# Patient Record
Sex: FEMALE | Race: WHITE | Employment: OTHER | URBAN - METROPOLITAN AREA
[De-identification: names, ages, dates, MRNs, and addresses within clinical notes are randomized per-mention and may not be internally consistent; named-entity substitution may affect disease eponyms.]

---

## 2017-01-17 DIAGNOSIS — D51.3 OTHER DIETARY VITAMIN B12 DEFICIENCY ANEMIA: Primary | ICD-10-CM

## 2017-01-22 RX ORDER — CYANOCOBALAMIN 1000 UG/ML
INJECTION INTRAMUSCULAR; SUBCUTANEOUS
Qty: 3 ML | Refills: 0 | Status: SHIPPED | OUTPATIENT
Start: 2017-01-22 | End: 2018-04-20

## 2018-04-20 DIAGNOSIS — D51.3 OTHER DIETARY VITAMIN B12 DEFICIENCY ANEMIA: ICD-10-CM

## 2018-04-23 RX ORDER — CYANOCOBALAMIN 1000 UG/ML
INJECTION INTRAMUSCULAR; SUBCUTANEOUS
Qty: 3 ML | Refills: 0 | Status: SHIPPED | OUTPATIENT
Start: 2018-04-23 | End: 2018-04-25

## 2018-04-25 DIAGNOSIS — D51.3 OTHER DIETARY VITAMIN B12 DEFICIENCY ANEMIA: ICD-10-CM

## 2018-04-25 RX ORDER — CYANOCOBALAMIN 1000 UG/ML
1000 INJECTION INTRAMUSCULAR; SUBCUTANEOUS
Qty: 3 ML | Refills: 3 | Status: SHIPPED | OUTPATIENT
Start: 2018-06-25 | End: 2019-05-22

## 2019-05-22 DIAGNOSIS — D51.3 OTHER DIETARY VITAMIN B12 DEFICIENCY ANEMIA: ICD-10-CM

## 2019-05-24 RX ORDER — CYANOCOBALAMIN 1000 UG/ML
1000 INJECTION INTRAMUSCULAR; SUBCUTANEOUS
Qty: 3 ML | Refills: 3 | Status: SHIPPED | OUTPATIENT
Start: 2019-05-24 | End: 2019-11-01

## 2019-05-24 RX ORDER — CYANOCOBALAMIN 1000 UG/ML
INJECTION INTRAMUSCULAR; SUBCUTANEOUS
Qty: 3 ML | Refills: 0 | Status: SHIPPED | OUTPATIENT
Start: 2019-05-24

## 2020-02-06 RX ORDER — TORSEMIDE 20 MG/1
20 TABLET ORAL DAILY
COMMUNITY

## 2020-02-06 RX ORDER — AMIODARONE HYDROCHLORIDE 200 MG/1
200 TABLET ORAL DAILY
COMMUNITY

## 2020-02-06 RX ORDER — RANITIDINE 300 MG/1
300 TABLET ORAL 2 TIMES DAILY
COMMUNITY
End: 2021-09-13

## 2020-02-06 RX ORDER — POTASSIUM CHLORIDE 1500 MG/1
20 TABLET, FILM COATED, EXTENDED RELEASE ORAL AS NEEDED
COMMUNITY

## 2020-02-06 RX ORDER — PANTOPRAZOLE SODIUM 40 MG/1
40 TABLET, DELAYED RELEASE ORAL
COMMUNITY
End: 2020-12-17

## 2020-02-06 RX ORDER — DILTIAZEM HYDROCHLORIDE 120 MG/1
120 TABLET, FILM COATED ORAL 2 TIMES DAILY
Status: ON HOLD | COMMUNITY
End: 2020-02-11

## 2020-02-06 RX ORDER — LEVOTHYROXINE SODIUM 0.12 MG/1
125 TABLET ORAL
COMMUNITY

## 2020-02-08 ENCOUNTER — ANESTHESIA EVENT (OUTPATIENT)
Dept: SURGERY | Facility: HOSPITAL | Age: 80
End: 2020-02-08
Payer: MEDICARE

## 2020-02-11 ENCOUNTER — HOSPITAL ENCOUNTER (OUTPATIENT)
Facility: HOSPITAL | Age: 80
Discharge: HOME | End: 2020-02-11
Attending: OBSTETRICS & GYNECOLOGY | Admitting: OBSTETRICS & GYNECOLOGY
Payer: MEDICARE

## 2020-02-11 ENCOUNTER — ANESTHESIA (OUTPATIENT)
Dept: SURGERY | Facility: HOSPITAL | Age: 80
End: 2020-02-11
Payer: MEDICARE

## 2020-02-11 ENCOUNTER — HOSPITAL ENCOUNTER (OUTPATIENT)
Dept: ULTRASOUND IMAGING | Facility: HOSPITAL | Age: 80
Discharge: HOME | End: 2020-02-11
Attending: OBSTETRICS & GYNECOLOGY
Payer: MEDICARE

## 2020-02-11 VITALS
SYSTOLIC BLOOD PRESSURE: 135 MMHG | DIASTOLIC BLOOD PRESSURE: 59 MMHG | TEMPERATURE: 98 F | RESPIRATION RATE: 18 BRPM | WEIGHT: 192.88 LBS | OXYGEN SATURATION: 95 % | HEART RATE: 55 BPM | BODY MASS INDEX: 32.93 KG/M2 | HEIGHT: 64 IN

## 2020-02-11 DIAGNOSIS — R93.89 THICKENED ENDOMETRIUM: ICD-10-CM

## 2020-02-11 PROCEDURE — 88305 TISSUE EXAM BY PATHOLOGIST: CPT | Performed by: OBSTETRICS & GYNECOLOGY

## 2020-02-11 PROCEDURE — 0UDB7ZX EXTRACTION OF ENDOMETRIUM, VIA NATURAL OR ARTIFICIAL OPENING, DIAGNOSTIC: ICD-10-PCS | Performed by: OBSTETRICS & GYNECOLOGY

## 2020-02-11 RX ORDER — SODIUM CHLORIDE, SODIUM LACTATE, POTASSIUM CHLORIDE, CALCIUM CHLORIDE 600; 310; 30; 20 MG/100ML; MG/100ML; MG/100ML; MG/100ML
INJECTION, SOLUTION INTRAVENOUS CONTINUOUS
Status: DISCONTINUED | OUTPATIENT
Start: 2020-02-11 | End: 2020-02-11

## 2020-02-11 RX ORDER — ERGOCALCIFEROL (VITAMIN D2) 1250 MCG
50000 CAPSULE ORAL WEEKLY
COMMUNITY

## 2020-02-11 RX ORDER — ONDANSETRON 2 MG/ML
4 INJECTION INTRAMUSCULAR; INTRAVENOUS AS NEEDED
Status: DISCONTINUED | OUTPATIENT
Start: 2020-02-11 | End: 2020-02-11

## 2020-02-11 RX ORDER — DILTIAZEM HYDROCHLORIDE 120 MG/1
120 CAPSULE, COATED, EXTENDED RELEASE ORAL DAILY
COMMUNITY

## 2020-02-11 RX ORDER — NALOXONE HYDROCHLORIDE 0.4 MG/ML
80 INJECTION, SOLUTION INTRAMUSCULAR; INTRAVENOUS; SUBCUTANEOUS AS NEEDED
Status: DISCONTINUED | OUTPATIENT
Start: 2020-02-11 | End: 2020-02-11

## 2020-02-11 RX ORDER — ACETAMINOPHEN 500 MG
1000 TABLET ORAL ONCE
Status: DISCONTINUED | OUTPATIENT
Start: 2020-02-11 | End: 2020-02-11 | Stop reason: HOSPADM

## 2020-02-11 RX ORDER — HYDROMORPHONE HYDROCHLORIDE 1 MG/ML
0.4 INJECTION, SOLUTION INTRAMUSCULAR; INTRAVENOUS; SUBCUTANEOUS EVERY 5 MIN PRN
Status: DISCONTINUED | OUTPATIENT
Start: 2020-02-11 | End: 2020-02-11

## 2020-02-11 RX ORDER — ONDANSETRON 2 MG/ML
INJECTION INTRAMUSCULAR; INTRAVENOUS AS NEEDED
Status: DISCONTINUED | OUTPATIENT
Start: 2020-02-11 | End: 2020-02-11 | Stop reason: SURG

## 2020-02-11 RX ORDER — HEPARIN SODIUM 5000 [USP'U]/ML
5000 INJECTION, SOLUTION INTRAVENOUS; SUBCUTANEOUS ONCE
Status: COMPLETED | OUTPATIENT
Start: 2020-02-11 | End: 2020-02-11

## 2020-02-11 RX ORDER — LIDOCAINE HYDROCHLORIDE 10 MG/ML
INJECTION, SOLUTION EPIDURAL; INFILTRATION; INTRACAUDAL; PERINEURAL AS NEEDED
Status: DISCONTINUED | OUTPATIENT
Start: 2020-02-11 | End: 2020-02-11 | Stop reason: SURG

## 2020-02-11 RX ORDER — METOCLOPRAMIDE HYDROCHLORIDE 5 MG/ML
10 INJECTION INTRAMUSCULAR; INTRAVENOUS AS NEEDED
Status: DISCONTINUED | OUTPATIENT
Start: 2020-02-11 | End: 2020-02-11

## 2020-02-11 RX ORDER — ACETAMINOPHEN 500 MG
1000 TABLET ORAL EVERY 6 HOURS PRN
COMMUNITY

## 2020-02-11 RX ORDER — DEXAMETHASONE SODIUM PHOSPHATE 4 MG/ML
VIAL (ML) INJECTION AS NEEDED
Status: DISCONTINUED | OUTPATIENT
Start: 2020-02-11 | End: 2020-02-11 | Stop reason: SURG

## 2020-02-11 RX ORDER — DILTIAZEM HYDROCHLORIDE 120 MG/1
120 CAPSULE, EXTENDED RELEASE ORAL DAILY
Status: ON HOLD | COMMUNITY
End: 2020-02-11

## 2020-02-11 RX ADMIN — SODIUM CHLORIDE, SODIUM LACTATE, POTASSIUM CHLORIDE, CALCIUM CHLORIDE: 600; 310; 30; 20 INJECTION, SOLUTION INTRAVENOUS at 15:09:00

## 2020-02-11 RX ADMIN — DEXAMETHASONE SODIUM PHOSPHATE 4 MG: 4 MG/ML VIAL (ML) INJECTION at 15:26:00

## 2020-02-11 RX ADMIN — ONDANSETRON 4 MG: 2 INJECTION INTRAMUSCULAR; INTRAVENOUS at 15:34:00

## 2020-02-11 RX ADMIN — LIDOCAINE HYDROCHLORIDE 50 MG: 10 INJECTION, SOLUTION EPIDURAL; INFILTRATION; INTRACAUDAL; PERINEURAL at 15:15:00

## 2020-02-11 NOTE — ANESTHESIA PROCEDURE NOTES
Airway  Date/Time: 2/11/2020 3:15 PM  Urgency: elective    Airway not difficult    General Information and Staff    Patient location during procedure: OR  Anesthesiologist: Alma Rosa Morse MD  Performed: anesthesiologist     Indications and Patient Condition

## 2020-02-11 NOTE — H&P
[facility]    PATIENT'S NAME: Michele Jose   ATTENDING PHYSICIAN: Rodri Orosco M.D.    PATIENT ACCOUNT#:   [de-identified]    LOCATION:    MEDICAL RECORD #:   [id number]       YOB: 1940  ADMISSION DATE:       02/11/2020    HISTORY AND

## 2020-02-11 NOTE — PROGRESS NOTES
[facility]  Brief Op Note    Bradford Child Location: OR   [facility] [id number] MRN [id number]   Admission Date 2/11/2020 Operation Date 2/11/2020   Attending Physician Peter Bruce MD Operating Physician Memo Catalan MD     Pre-Operative Diagnosis:

## 2020-02-11 NOTE — DISCHARGE SUMMARY
No complicationsDischarge Summary         Patient ID:  Sherwin Nichols  [id number]  33 year old  6/18/1940    Admission Date: 2/11/2020   Discharge Date:    Discharge Diagnoses: * No post-op diagnosis entered *    Procedures: [unfilled]    Discharged Co

## 2020-02-11 NOTE — ANESTHESIA PREPROCEDURE EVALUATION
PRE-OP EVALUATION    Patient Name: Rhoda Simons    Pre-op Diagnosis: OVARIAN CYST, ENDOMETRIAL THICKENING    Procedure(s):  ULTRASOUND GUIDED DILATION AND CURETTAGE    Surgeon(s) and Role:     Kai Patel MD - Primary    Pre-op vitals reviewed. Smokeless tobacco: Never Used    Alcohol use: No      Drug use: No     Available pre-op labs reviewed.                Airway      Mallampati: II  Mouth opening: >3 FB  TM distance: 4 - 6 cm  Neck ROM: full Cardiovascular    Cardiovascular exam normal.

## 2020-02-11 NOTE — ANESTHESIA POSTPROCEDURE EVALUATION
[facility] Patient Status:  Outpatient in a Bed   Age/Gender 70year old female MRN [id number]   Location [facility] Attending Denny Tee MD   Hosp Day # 0 PCP No primary care provider on file.        Anesthesia Pos

## 2020-02-12 NOTE — OPERATIVE REPORT
[facility]    PATIENT'S NAME: Manuel Moon   ATTENDING PHYSICIAN: Melany Giron M.D. OPERATING PHYSICIAN: Melany Giron M.D.    PATIENT ACCOUNT#:   [de-identified]    LOCATION:  [address] [place] [address] 10  MEDICAL RECORD #:   [id number]

## 2020-02-25 NOTE — PROGRESS NOTES
Discharge Summary         Patient ID:  Silvia Rowley  [id number]  54 year old  6/18/1940    Admission Date: 2/11/2020   Discharge Date: 2/11/2020   Discharge Diagnoses: OVARIAN CYST, ENDOMETRIAL THICKENING    Procedures: [unfilled]    Discharged Condi

## 2020-03-03 DIAGNOSIS — I48.91 ATRIAL FIBRILLATION, UNSPECIFIED TYPE (HCC): Primary | ICD-10-CM

## 2020-03-03 RX ORDER — TORSEMIDE 20 MG/1
20 TABLET ORAL 2 TIMES DAILY
Qty: 60 TABLET | Refills: 0 | Status: SHIPPED | OUTPATIENT
Start: 2020-03-03

## 2020-03-10 NOTE — PROGRESS NOTES
Discharge Summary         Patient ID:  Celso Merchant  [id number]  36 year old  6/18/1940    Admission Date: 2/11/2020   Discharge Date: 2/11/2020   Discharge Diagnoses: OVARIAN CYST, ENDOMETRIAL THICKENING    Procedures: [unfilled]    Discharged Condi

## 2020-04-14 NOTE — PROGRESS NOTES
Discharge Summary         Patient ID:  Bradford Child  [id number]  50 year old  6/18/1940    Admission Date: 2/11/2020   Discharge Date: 2/11/2020   Discharge Diagnoses: OVARIAN CYST, ENDOMETRIAL THICKENING    Procedures: [unfilled]    Discharged Condi

## 2020-06-15 ENCOUNTER — HOSPITAL ENCOUNTER (INPATIENT)
Facility: HOSPITAL | Age: 80
LOS: 2 days | Discharge: HOME | DRG: 389 | End: 2020-06-17
Attending: SURGERY | Admitting: SURGERY
Payer: MEDICARE

## 2020-06-15 ENCOUNTER — APPOINTMENT (OUTPATIENT)
Dept: GENERAL RADIOLOGY | Facility: HOSPITAL | Age: 80
DRG: 389 | End: 2020-06-15
Attending: SURGERY
Payer: MEDICARE

## 2020-06-15 DIAGNOSIS — K56.609 SBO (SMALL BOWEL OBSTRUCTION) (HCC): Primary | ICD-10-CM

## 2020-06-15 DIAGNOSIS — D50.0 IRON DEFICIENCY ANEMIA DUE TO CHRONIC BLOOD LOSS: ICD-10-CM

## 2020-06-15 PROCEDURE — 0D9670Z DRAINAGE OF STOMACH WITH DRAINAGE DEVICE, VIA NATURAL OR ARTIFICIAL OPENING: ICD-10-PCS | Performed by: SURGERY

## 2020-06-15 PROCEDURE — 99223 1ST HOSP IP/OBS HIGH 75: CPT | Performed by: HOSPITALIST

## 2020-06-15 PROCEDURE — 74018 RADEX ABDOMEN 1 VIEW: CPT | Performed by: SURGERY

## 2020-06-15 PROCEDURE — 71045 X-RAY EXAM CHEST 1 VIEW: CPT | Performed by: SURGERY

## 2020-06-15 RX ORDER — NITROGLYCERIN 0.4 MG/1
TABLET SUBLINGUAL
Status: DISPENSED
Start: 2020-06-15 | End: 2020-06-16

## 2020-06-15 RX ORDER — NITROGLYCERIN 0.4 MG/1
TABLET SUBLINGUAL
Status: COMPLETED
Start: 2020-06-15 | End: 2020-06-15

## 2020-06-15 RX ORDER — ONDANSETRON 2 MG/ML
4 INJECTION INTRAMUSCULAR; INTRAVENOUS EVERY 6 HOURS PRN
Status: DISCONTINUED | OUTPATIENT
Start: 2020-06-15 | End: 2020-06-17

## 2020-06-15 RX ORDER — NITROGLYCERIN 0.6 MG/1
0.6 TABLET SUBLINGUAL ONCE
Status: DISCONTINUED | OUTPATIENT
Start: 2020-06-15 | End: 2020-06-17

## 2020-06-15 RX ORDER — ENOXAPARIN SODIUM 100 MG/ML
40 INJECTION SUBCUTANEOUS DAILY
Status: DISCONTINUED | OUTPATIENT
Start: 2020-06-15 | End: 2020-06-17

## 2020-06-15 RX ORDER — METOCLOPRAMIDE HYDROCHLORIDE 5 MG/ML
10 INJECTION INTRAMUSCULAR; INTRAVENOUS EVERY 6 HOURS PRN
Status: DISCONTINUED | OUTPATIENT
Start: 2020-06-15 | End: 2020-06-17

## 2020-06-15 RX ORDER — ONDANSETRON 4 MG/1
4 TABLET, ORALLY DISINTEGRATING ORAL EVERY 6 HOURS PRN
Status: DISCONTINUED | OUTPATIENT
Start: 2020-06-15 | End: 2020-06-17

## 2020-06-15 RX ORDER — SODIUM CHLORIDE, SODIUM LACTATE, POTASSIUM CHLORIDE, CALCIUM CHLORIDE 600; 310; 30; 20 MG/100ML; MG/100ML; MG/100ML; MG/100ML
INJECTION, SOLUTION INTRAVENOUS CONTINUOUS
Status: DISCONTINUED | OUTPATIENT
Start: 2020-06-15 | End: 2020-06-16

## 2020-06-15 RX ORDER — CYANOCOBALAMIN 1000 UG/ML
1000 INJECTION INTRAMUSCULAR; SUBCUTANEOUS
Status: DISCONTINUED | OUTPATIENT
Start: 2020-06-15 | End: 2020-06-16

## 2020-06-15 RX ORDER — ONDANSETRON 2 MG/ML
INJECTION INTRAMUSCULAR; INTRAVENOUS
Status: COMPLETED
Start: 2020-06-15 | End: 2020-06-15

## 2020-06-15 RX ORDER — NITROGLYCERIN 0.4 MG/1
0.4 TABLET SUBLINGUAL EVERY 5 MIN PRN
Status: DISCONTINUED | OUTPATIENT
Start: 2020-06-15 | End: 2020-06-17

## 2020-06-15 RX ORDER — ACETAMINOPHEN 10 MG/ML
1000 INJECTION, SOLUTION INTRAVENOUS EVERY 6 HOURS
Status: DISCONTINUED | OUTPATIENT
Start: 2020-06-15 | End: 2020-06-17

## 2020-06-15 RX ORDER — METOPROLOL TARTRATE 5 MG/5ML
5 INJECTION INTRAVENOUS EVERY 6 HOURS
Status: DISCONTINUED | OUTPATIENT
Start: 2020-06-15 | End: 2020-06-17

## 2020-06-16 ENCOUNTER — APPOINTMENT (OUTPATIENT)
Dept: CT IMAGING | Facility: HOSPITAL | Age: 80
DRG: 389 | End: 2020-06-16
Attending: SURGERY
Payer: MEDICARE

## 2020-06-16 PROCEDURE — 99232 SBSQ HOSP IP/OBS MODERATE 35: CPT | Performed by: HOSPITALIST

## 2020-06-16 PROCEDURE — 99222 1ST HOSP IP/OBS MODERATE 55: CPT | Performed by: INTERNAL MEDICINE

## 2020-06-16 PROCEDURE — 74177 CT ABD & PELVIS W/CONTRAST: CPT | Performed by: SURGERY

## 2020-06-16 RX ORDER — CYANOCOBALAMIN 1000 UG/ML
1000 INJECTION INTRAMUSCULAR; SUBCUTANEOUS
Status: DISCONTINUED | OUTPATIENT
Start: 2020-07-15 | End: 2020-06-17

## 2020-06-16 RX ORDER — DEXTROSE, SODIUM CHLORIDE, AND POTASSIUM CHLORIDE 5; .45; .15 G/100ML; G/100ML; G/100ML
INJECTION INTRAVENOUS CONTINUOUS
Status: DISCONTINUED | OUTPATIENT
Start: 2020-06-16 | End: 2020-06-17

## 2020-06-16 NOTE — CONSULTS
[facility]  Report of Consultation    Chiphon Ramon Patient Status:  Inpatient    1940 MRN [id number]   [facility] 7NE-A Attending Kiera Murray MD   Hosp Day # 1 PCP PHYSICIAN NONSTAFF     Reason for Consultation:    The anayeli reports that she does not drink alcohol or use drugs.     Allergies:  No Known Allergies    Medications:    Current Facility-Administered Medications:   •  dextrose 5 % and 0.45 % NaCl with KCl 20 mEq infusion, , Intravenous, Continuous  •  benzocaine-menth lymphadenopathy. Neck is supple. Respiratory: Clear to auscultation and percussion. No rales. No wheezes. Cardiovascular: Regular rate and rhythm. Gastrointestinal: Soft, non tender with good bowel sounds. Extremities: No edema. No calf tenderness. ileum. On the outside exam of 6/15/2020 there were distended small bowel loops to the level of the mid mesentery slightly to the left of midline the largest loops measured 4.5 cm in greatest dimension.  These are significantly improved on today's exam maxim Please correlate clinically.       Impression and Plan:    Patient Active Problem List:     Shoulder pain     Shoulder fracture, right     Shoulder arthritis     SBO (small bowel obstruction) (HCC)     PAF (paroxysmal atrial fibrillation) ([facility] [address]     CREST (c

## 2020-06-16 NOTE — PROGRESS NOTES
[facility]  Progress Note     Jesus Alberto Jean Patient Status:  Inpatient    1940 MRN [id number]   [facility] 7NE-A Attending Lynn Jones MD   Hosp Day # 1 PCP PHYSICIAN NONSTAFF     Chief Complaint: Medical Management    S: P 1,000 mcg Intramuscular Q30 Days   • pantoprazole (PROTONIX) IV push  40 mg Intravenous Q12H       ASSESSMENT / PLAN:     1. Partial SBO:  NG decompression, IVF's, Bowel rest, eventual SBFT pending hospital course.   2. ARMEN:  Start Venofer, suspect absorpti

## 2020-06-16 NOTE — H&P
[facility]    Patient Name:  Celso Merchant   YOB: 1940   Gender:  Female   Appt Date:  6/15/2020   Provider:  No name on file.    Insurance:  MEDICARE PART A&B     PATIENT PROVIDERS  Referring Provid She was taken to [facility] where work-up included panel of labs which showed anemia at 7.8 but were otherwise largely unremarkable. Notably, her white blood cell count was within normal limits. A CT of the abdomen pelvis was obtained. Alcohol use: No      Drug use: No     Reviewed:  No family history on file. Review of Systems:  Review of Systems   Constitutional: Negative for activity change, appetite change, chills, fatigue, fever and unexpected weight change.    HENT: Negative CLINICAL HISTORY: 70years of age, Female, epigastric pain, bilious vomiting, h/o open momo. COMPARISON: None.     PROCEDURE COMMENTS: CT of the abdomen and pelvis was performed following administration of IV contrast. Oral contrast was administered nova Uterus and ovaries: Slightly globular appearance of the uterus suggesting fibroids or adenomyosis, incompletely characterized on this exam.  No adnexal masses. Vasculature: Atherosclerosis without aneurysm.     Lymph nodes: No abdominal or pelvic lymphad Lower thorax: Visualized lung bases are clear. Heart is normal in size. Liver and biliary tree: No focal liver lesions.   Mild central intrahepatic biliary prominence without dilatation of the common bile duct, likely secondary to postcholecystectomy Abdominal wall: Small bowel containing right direct inguinal hernia. Tiny fat-containing umbilical hernia. Rectus diastases. Musculoskeletal: Degenerative change of the spine. Grade 1 anterolisthesis of L3 with respect to L4.   Transitional lumbosacra Torsemide 20 mg as needed, will hold for now, Bumex 2mg would be the alternative, WILL HOLD FOR NOW  Reportedly, ejection fraction within normal limits.   Start IVF  cc's   Also note history of anemia, receives month B12 shots, remote IV Iron    Elmer

## 2020-06-16 NOTE — H&P
[facility]  History and Physical     Lucero Garcia Patient Status:  Inpatient    1940 MRN [id number]   [facility] 7NE-A Attending Geena Johansen MD   Hosp Day # 0 PCP PHYSICIAN NONSTAFF     Chief Complaint: chest, abd pain facility-administered medications on file prior to encounter. torsemide 20 MG Oral Tab, Take 1 tablet (20 mg total) by mouth 2 (two) times daily. , Disp: 60 tablet, Rfl: 0  acetaminophen 500 MG Oral Tab, Take 1,000 mg by mouth one time. , Disp: , Rfl:   er deformity. Abdomen: Soft, nontender, nondistended. No bowel sounds. No rebound, guarding or organomegaly. Neurologic: No focal neurological deficits. CNII-XII grossly intact. Musculoskeletal: Moves all extremities. Extremities: No cyanosis.  2+ b/l LE NPO  11. HTN  12. HLD    Quality:  · DVT Prophylaxis: lovenox  · CODE status: full  · Pollock: no    Plan of care discussed with pt, rn, dr Vanessa Montesinos, dr Leandro Nowak MD  6/15/2020    ADDENDUM:  Iron deficient. Give IV venofer now.  Will likely need

## 2020-06-16 NOTE — PLAN OF CARE
[facility] Dr Zack Carias at bedside. Aox4. NG tube in place, Rt nare. Placed on LIS. Tan, thick drainage. Episode of emesis, tan, same color as NG output. PRN zofran/reglan with relief. Abdomen soft, rounded. Hypoactive BS. baseline  Description  INTERVENTIONS:  - Continuous cardiac monitoring, monitor vital signs, obtain 12 lead EKG if indicated  - Evaluate effectiveness of antiarrhythmic and heart rate control medications as ordered  - Initiate emergency measures for life t routine/schedule  - Consider collaborating with pharmacy to review patient's medication profile  Outcome: Progressing

## 2020-06-16 NOTE — PHYSICAL THERAPY NOTE
PHYSICAL THERAPY EVALUATION - INPATIENT     Room Number: 6539/7876-Q  Evaluation Date: 6/16/2020  Type of Evaluation: Initial  Physician Order: PT Eval and Treat    Presenting Problem: partial SBO  Reason for Therapy: Mobility Dysfunction and Dischar ASSESSMENT  Upper extremity ROM and strength are within functional limits     Lower extremity ROM is within functional limits     Lower extremity strength is within functional limits     BALANCE  Static Sitting: Fair +  Dynamic Sitting: Fair  Static Standi positioning.,activity. PPE donned for session: gloves, surgical mask.         Exercise/Education Provided:  Bed mobility  Body mechanics  Energy conservation  Functional activity tolerated  Posture  Transfer training    Patient End of Session: Up in chair;N assist device: least restrictive device at assistance level: modified independent     Goal #4 Pt will be independent with HEP.     Goal #5    Goal #6    Goal Comments: Goals established on 6/16/2020

## 2020-06-16 NOTE — PHYSICAL THERAPY NOTE
PT consult received per ADL Mobility Score. RN requesting to hold, as pt just returned to bed to rest. Will continue to follow.

## 2020-06-16 NOTE — PLAN OF CARE
Assumed care of patient at 0700  A/Ox4, RA but desats when sleeping.  NSR/SB on tele  NGT removed by MD  Diet updated to full liquids  PT rec HHPT  Cont IV fluids  Hem on consult  Patient has chronic iron deficiency  CT enterography done, Hem will review wi

## 2020-06-16 NOTE — PROGRESS NOTES
Surgical Oncology Inpatient Progress Note    Subjective:  No acute events overnight. Patient stable and afebrile. NG output cleared overnight, around 200 cc in the last shift. No longer bilious. Patient feels much better this morning.     Objective:  Te

## 2020-06-16 NOTE — CONSULTS
[facility] Patient Status:  Inpatient    1940 MRN [id number]   [facility] 7NE-A Attending Belkis Dunlap MD   Hosp Day # 0 PCP PHYSICIAN NONSTAFF     Chief Complaint: chest, abd pain    History of medications on file prior to encounter. torsemide 20 MG Oral Tab, Take 1 tablet (20 mg total) by mouth 2 (two) times daily. , Disp: 60 tablet, Rfl: 0  acetaminophen 500 MG Oral Tab, Take 1,000 mg by mouth one time. , Disp: , Rfl:   ergocalciferol 1.25 MG ( nondistended. No bowel sounds. No rebound, guarding or organomegaly. Neurologic: No focal neurological deficits. CNII-XII grossly intact. Musculoskeletal: Moves all extremities. Extremities: No cyanosis.  2+ b/l LE edema  Integument: No rashes or lesion Prophylaxis: lovenox  · CODE status: full  · Pollock: no    Plan of care discussed with pt, rn, dr Mela Vargas, dr Ada Olivo MD  6/15/2020    ADDENDUM:  Iron deficient. Give IV venofer now.  Will likely need multiple doses    Mari Smith MD

## 2020-06-17 ENCOUNTER — APPOINTMENT (OUTPATIENT)
Dept: CT IMAGING | Facility: HOSPITAL | Age: 80
DRG: 389 | End: 2020-06-17
Attending: PHYSICIAN ASSISTANT
Payer: MEDICARE

## 2020-06-17 VITALS
RESPIRATION RATE: 18 BRPM | SYSTOLIC BLOOD PRESSURE: 117 MMHG | DIASTOLIC BLOOD PRESSURE: 50 MMHG | BODY MASS INDEX: 33 KG/M2 | TEMPERATURE: 98 F | OXYGEN SATURATION: 97 % | HEART RATE: 58 BPM | WEIGHT: 190.13 LBS

## 2020-06-17 DIAGNOSIS — R07.89 OTHER CHEST PAIN: ICD-10-CM

## 2020-06-17 DIAGNOSIS — D50.0 IRON DEFICIENCY ANEMIA DUE TO CHRONIC BLOOD LOSS: Primary | ICD-10-CM

## 2020-06-17 PROCEDURE — 99232 SBSQ HOSP IP/OBS MODERATE 35: CPT | Performed by: HOSPITALIST

## 2020-06-17 PROCEDURE — 71260 CT THORAX DX C+: CPT | Performed by: PHYSICIAN ASSISTANT

## 2020-06-17 RX ORDER — LEVOTHYROXINE SODIUM 0.12 MG/1
125 TABLET ORAL
Status: DISCONTINUED | OUTPATIENT
Start: 2020-06-18 | End: 2020-06-17

## 2020-06-17 NOTE — PROGRESS NOTES
[facility]  Progress Note     Nona Smith Patient Status:  Inpatient    1940 MRN [id number]   [facility] 7NE-A Attending Pawan Pike MD   Hosp Day # 2 PCP PHYSICIAN NONSTAFF     Chief Complaint: Medical Management    S: P Subcutaneous Daily   • amiodarone (CORDARONE) IV bolus  100 mg Intravenous Q48H   • metoprolol Tartrate  5 mg Intravenous Q6H   • pantoprazole (PROTONIX) IV push  40 mg Intravenous Q12H       ASSESSMENT / PLAN:     1. Partial SBO  1. Resolving  2.  NGT out

## 2020-06-17 NOTE — PLAN OF CARE
NURSING DISCHARGE NOTE    Discharged Home via Wheelchair. Accompanied by Family member  Belongings Taken by patient/family.     Received pt a/ox4, RA, SB on tele at 0700  CT chest completed  Tolerating low fiber diet  BLE +2 edema  Pt educated on disch oxygenation  Description  INTERVENTIONS:  - Assess for changes in respiratory status  - Assess for changes in mentation and behavior  - Position to facilitate oxygenation and minimize respiratory effort  - Oxygen supplementation based on oxygen saturation Progressing

## 2020-06-17 NOTE — PROGRESS NOTES
Surgical Oncology Inpatient Progress Note    Subjective:  No acute events overnight. Patient stable and afebrile. NG removed yesterday. Tolerating full liquids. Feels well. Pain resolved.      Objective:  Temp:  [97.8 °F (36.6 °C)-98.7 °F (37.1 °C)] 98.7

## 2020-06-17 NOTE — PLAN OF CARE
Assumed care at [address]. AOx4. Denies any abdominal pain or discomfort. Desat to 85% while sleeping. 2L O2 applied at night. Sinus tomás on tele monitor and . IV Lopressor on hold. 2+ edema to Bilateral lower extremities.   Continue on IV fluid and Assess and instruct to report SOB or any respiratory difficulty  - Respiratory Therapy support as indicated  - Manage/alleviate anxiety  - Monitor for signs/symptoms of CO2 retention  Outcome: Progressing     Problem: GASTROINTESTINAL - ADULT  Goal: [employer]

## 2020-06-17 NOTE — CM/SW NOTE
Pt admitted for SBO. Pt lives with her  in [facility]. PT is recommending HHPT. Family declined [facility] at this time. / to remain available for support and/or discharge planning.

## 2020-07-29 ENCOUNTER — ANESTHESIA (OUTPATIENT)
Dept: ENDOSCOPY | Facility: HOSPITAL | Age: 80
End: 2020-07-29
Payer: MEDICARE

## 2020-07-29 ENCOUNTER — HOSPITAL ENCOUNTER (OUTPATIENT)
Facility: HOSPITAL | Age: 80
Setting detail: HOSPITAL OUTPATIENT SURGERY
Discharge: HOME | End: 2020-07-29
Attending: INTERNAL MEDICINE | Admitting: INTERNAL MEDICINE
Payer: MEDICARE

## 2020-07-29 ENCOUNTER — ANESTHESIA EVENT (OUTPATIENT)
Dept: ENDOSCOPY | Facility: HOSPITAL | Age: 80
End: 2020-07-29
Payer: MEDICARE

## 2020-07-29 VITALS
TEMPERATURE: 98 F | DIASTOLIC BLOOD PRESSURE: 73 MMHG | BODY MASS INDEX: 30.73 KG/M2 | HEART RATE: 57 BPM | SYSTOLIC BLOOD PRESSURE: 143 MMHG | HEIGHT: 64 IN | OXYGEN SATURATION: 92 % | RESPIRATION RATE: 18 BRPM | WEIGHT: 180 LBS

## 2020-07-29 DIAGNOSIS — R10.9 ABDOMINAL PAIN, UNSPECIFIED ABDOMINAL LOCATION: ICD-10-CM

## 2020-07-29 DIAGNOSIS — Z12.11 COLON CANCER SCREENING: ICD-10-CM

## 2020-07-29 DIAGNOSIS — Z87.19 HISTORY OF ESOPHAGITIS: ICD-10-CM

## 2020-07-29 DIAGNOSIS — R93.3 ABNORMAL CT SCAN, COLON: ICD-10-CM

## 2020-07-29 PROCEDURE — 85025 COMPLETE CBC W/AUTO DIFF WBC: CPT | Performed by: INTERNAL MEDICINE

## 2020-07-29 PROCEDURE — 0DB98ZX EXCISION OF DUODENUM, VIA NATURAL OR ARTIFICIAL OPENING ENDOSCOPIC, DIAGNOSTIC: ICD-10-PCS | Performed by: INTERNAL MEDICINE

## 2020-07-29 PROCEDURE — 80053 COMPREHEN METABOLIC PANEL: CPT | Performed by: INTERNAL MEDICINE

## 2020-07-29 PROCEDURE — 88305 TISSUE EXAM BY PATHOLOGIST: CPT | Performed by: INTERNAL MEDICINE

## 2020-07-29 PROCEDURE — 0DB68ZX EXCISION OF STOMACH, VIA NATURAL OR ARTIFICIAL OPENING ENDOSCOPIC, DIAGNOSTIC: ICD-10-PCS | Performed by: INTERNAL MEDICINE

## 2020-07-29 PROCEDURE — 88312 SPECIAL STAINS GROUP 1: CPT | Performed by: INTERNAL MEDICINE

## 2020-07-29 PROCEDURE — 84238 ASSAY NONENDOCRINE RECEPTOR: CPT | Performed by: INTERNAL MEDICINE

## 2020-07-29 PROCEDURE — 83540 ASSAY OF IRON: CPT | Performed by: INTERNAL MEDICINE

## 2020-07-29 PROCEDURE — 0DJD8ZZ INSPECTION OF LOWER INTESTINAL TRACT, VIA NATURAL OR ARTIFICIAL OPENING ENDOSCOPIC: ICD-10-PCS | Performed by: INTERNAL MEDICINE

## 2020-07-29 PROCEDURE — 0DB58ZX EXCISION OF ESOPHAGUS, VIA NATURAL OR ARTIFICIAL OPENING ENDOSCOPIC, DIAGNOSTIC: ICD-10-PCS | Performed by: INTERNAL MEDICINE

## 2020-07-29 PROCEDURE — 84466 ASSAY OF TRANSFERRIN: CPT | Performed by: INTERNAL MEDICINE

## 2020-07-29 PROCEDURE — 82728 ASSAY OF FERRITIN: CPT | Performed by: INTERNAL MEDICINE

## 2020-07-29 PROCEDURE — 82607 VITAMIN B-12: CPT | Performed by: INTERNAL MEDICINE

## 2020-07-29 PROCEDURE — 84443 ASSAY THYROID STIM HORMONE: CPT | Performed by: INTERNAL MEDICINE

## 2020-07-29 RX ORDER — SODIUM CHLORIDE, SODIUM LACTATE, POTASSIUM CHLORIDE, CALCIUM CHLORIDE 600; 310; 30; 20 MG/100ML; MG/100ML; MG/100ML; MG/100ML
INJECTION, SOLUTION INTRAVENOUS CONTINUOUS
Status: DISCONTINUED | OUTPATIENT
Start: 2020-07-29 | End: 2020-07-29

## 2020-07-29 RX ORDER — LIDOCAINE HYDROCHLORIDE 10 MG/ML
INJECTION, SOLUTION EPIDURAL; INFILTRATION; INTRACAUDAL; PERINEURAL AS NEEDED
Status: DISCONTINUED | OUTPATIENT
Start: 2020-07-29 | End: 2020-07-29 | Stop reason: SURG

## 2020-07-29 RX ADMIN — SODIUM CHLORIDE, SODIUM LACTATE, POTASSIUM CHLORIDE, CALCIUM CHLORIDE: 600; 310; 30; 20 INJECTION, SOLUTION INTRAVENOUS at 13:25:00

## 2020-07-29 RX ADMIN — LIDOCAINE HYDROCHLORIDE 25 MG: 10 INJECTION, SOLUTION EPIDURAL; INFILTRATION; INTRACAUDAL; PERINEURAL at 12:35:00

## 2020-07-29 RX ADMIN — SODIUM CHLORIDE, SODIUM LACTATE, POTASSIUM CHLORIDE, CALCIUM CHLORIDE: 600; 310; 30; 20 INJECTION, SOLUTION INTRAVENOUS at 12:32:00

## 2020-07-29 NOTE — ANESTHESIA POSTPROCEDURE EVALUATION
Patient: Rosa Valdez    Procedure Summary     Date:  07/29/20 Room / Location:  [facility] ENDOSCOPY 01 / [facility] ENDOSCOPY    Anesthesia Start:  0631 Anesthesia Stop:  3915    Procedures:       ESOPHAGOGASTRODUODENOSCOPY (EGD) (N/A )      COLONOSCOPY (N/A ) Kasia Canales

## 2020-07-29 NOTE — ANESTHESIA PREPROCEDURE EVALUATION
Anesthesia PreOp Note    HPI:     Ivy Stewart is a 68year old female who presents for preoperative consultation requested by: Randy Botello MD    Date of Surgery: 7/29/2020    Procedure(s):  ESOPHAGOGASTRODUODENOSCOPY (EGD)  COLONOSCOPY  Indication: dilTIAZem HCl ER Coated Beads 120 MG Oral Capsule SR 24 Hr, Take 120 mg by mouth 2 (two) times daily. , Disp: , Rfl: , 7/29/2020 at 1000  Levothyroxine Sodium 125 MCG Oral Tab, Take 125 mcg by mouth before breakfast., Disp: , Rfl: , 7/29/2020 at 0600  [place] Smoking status: Never Smoker      Smokeless tobacco: Never Used    Substance and Sexual Activity      Alcohol use: No      Drug use: No      Sexual activity: Not on file    Lifestyle      Physical activity:        Days per week: Not on file        Angélica Anesthesia Evaluation     Patient summary reviewed    No history of anesthetic complications   Airway   Mallampati: II  TM distance: >3 FB  Neck ROM: full  Dental          Pulmonary - negative ROS    breath sounds clear to auscultation  Cardiovascular   E

## 2020-07-29 NOTE — H&P
[facility] Department of  Gastroenterology  Update Health History :       Loyd Dumont  female   Osiel MD Bushra     [id number]  6/18/1940 Primary Care Physician  PHYSICIAN NONSTAFF        HPI :  Iron def anemia  GERD  Dysphagia     Patient H 20 mg by mouth daily. , Disp: , Rfl:   apixaban 5 MG Oral Tab, Take 5 mg by mouth 2 (two) times daily. , Disp: , Rfl:   raNITIdine HCl 300 MG Oral Tab, Take 300 mg by mouth 2 (two) times daily.   , Disp: , Rfl:   CYANOCOBALAMIN 1000 MCG/ML Injection Solution,

## 2020-07-29 NOTE — OPERATIVE REPORT
PATIENT NAME: Yvonne Alonso  MRN: [id number]  DATE OF OPERATION: 7/29/2020  PREOPERATIVE DIAGNOSIS:   1. Iron deficiency anemia  2. Dysphagia  3. GERD  POSTOPERATIVE DIAGNOSES:  1. Sliding hiatal hernia, Hill grade 2 approximately 2 cm in length.   2. Signif hernia and vertical length. 2. Normal stomach throughout with no evidence of ulcers, masses or other abnormalities. Retroflexion revealed a normal cardia and fundus. The antrum was normal and the pylorus was patent.   No obvious atrophy noted over the radha supplement. 4. Continue to monitor hemoglobin.     Rosalba Valerio MD  [facility] Gastroenterology

## 2020-07-30 NOTE — PROGRESS NOTES
[phone] (home)   Patient contacted and results and recommendations discussed. All questions answered.

## 2020-07-30 NOTE — PROGRESS NOTES
7/30/2020  [address] [place] [id number]    Dear Micah Gill,       Here are the biopsy/pathology findings from your recent EGD (Upper  Endoscopy):  1. Small bowel (duodenum) biopsies are unremarkable.  No changes as seen with linda

## 2020-08-07 ENCOUNTER — OFFICE VISIT (OUTPATIENT)
Dept: HEMATOLOGY/ONCOLOGY | Facility: HOSPITAL | Age: 80
End: 2020-08-07
Attending: SURGERY
Payer: MEDICARE

## 2020-08-07 VITALS
SYSTOLIC BLOOD PRESSURE: 124 MMHG | RESPIRATION RATE: 18 BRPM | BODY MASS INDEX: 31 KG/M2 | TEMPERATURE: 98 F | OXYGEN SATURATION: 95 % | WEIGHT: 179.19 LBS | DIASTOLIC BLOOD PRESSURE: 65 MMHG | HEART RATE: 56 BPM

## 2020-08-07 DIAGNOSIS — D50.0 IRON DEFICIENCY ANEMIA DUE TO CHRONIC BLOOD LOSS: Primary | ICD-10-CM

## 2020-08-07 PROCEDURE — 96365 THER/PROPH/DIAG IV INF INIT: CPT

## 2020-08-07 PROCEDURE — 96376 TX/PRO/DX INJ SAME DRUG ADON: CPT

## 2020-08-07 NOTE — PROGRESS NOTES
Education Record    Learner:  Patient    Disease / Diagnosis: ARMEN - IV Infed infusion    Barriers / Limitations:  None   Comments:    Method:  Brief focused and Reinforcement   Comments:    General Topics:  Plan of care reviewed   Comments:    Outcome:   Sh

## 2020-08-10 DIAGNOSIS — D50.0 IRON DEFICIENCY ANEMIA DUE TO CHRONIC BLOOD LOSS: Primary | ICD-10-CM

## 2021-01-28 DIAGNOSIS — D51.3 OTHER DIETARY VITAMIN B12 DEFICIENCY ANEMIA: ICD-10-CM

## 2021-01-28 RX ORDER — CYANOCOBALAMIN 1000 UG/ML
1000 INJECTION INTRAMUSCULAR; SUBCUTANEOUS
Qty: 3 ML | Refills: 3 | Status: SHIPPED | OUTPATIENT
Start: 2021-01-28 | End: 2021-12-25

## 2021-03-12 DIAGNOSIS — Z23 NEED FOR VACCINATION: ICD-10-CM

## 2021-09-10 ENCOUNTER — HOSPITAL ENCOUNTER (INPATIENT)
Facility: HOSPITAL | Age: 81
LOS: 1 days | Discharge: HOME | DRG: 390 | End: 2021-09-11
Attending: SURGERY | Admitting: SURGERY
Payer: MEDICARE

## 2021-09-10 DIAGNOSIS — E03.9 HYPOTHYROIDISM, UNSPECIFIED TYPE: ICD-10-CM

## 2021-09-10 DIAGNOSIS — I48.0 PAF (PAROXYSMAL ATRIAL FIBRILLATION) (HCC): ICD-10-CM

## 2021-09-10 DIAGNOSIS — K56.609 SBO (SMALL BOWEL OBSTRUCTION) (HCC): Primary | ICD-10-CM

## 2021-09-10 PROCEDURE — 85025 COMPLETE CBC W/AUTO DIFF WBC: CPT | Performed by: SURGERY

## 2021-09-10 PROCEDURE — 80053 COMPREHEN METABOLIC PANEL: CPT | Performed by: SURGERY

## 2021-09-10 PROCEDURE — 84100 ASSAY OF PHOSPHORUS: CPT | Performed by: SURGERY

## 2021-09-10 PROCEDURE — 83735 ASSAY OF MAGNESIUM: CPT | Performed by: SURGERY

## 2021-09-10 RX ORDER — AMIODARONE HYDROCHLORIDE 200 MG/1
100 TABLET ORAL DAILY
Status: DISCONTINUED | OUTPATIENT
Start: 2021-09-11 | End: 2021-09-12

## 2021-09-10 RX ORDER — SODIUM CHLORIDE, SODIUM LACTATE, POTASSIUM CHLORIDE, CALCIUM CHLORIDE 600; 310; 30; 20 MG/100ML; MG/100ML; MG/100ML; MG/100ML
INJECTION, SOLUTION INTRAVENOUS CONTINUOUS
Status: DISCONTINUED | OUTPATIENT
Start: 2021-09-10 | End: 2021-09-12

## 2021-09-10 RX ORDER — ENOXAPARIN SODIUM 100 MG/ML
40 INJECTION SUBCUTANEOUS DAILY
Status: DISCONTINUED | OUTPATIENT
Start: 2021-09-11 | End: 2021-09-11

## 2021-09-10 RX ORDER — ONDANSETRON 2 MG/ML
4 INJECTION INTRAMUSCULAR; INTRAVENOUS EVERY 6 HOURS PRN
Status: DISCONTINUED | OUTPATIENT
Start: 2021-09-10 | End: 2021-09-12

## 2021-09-10 RX ORDER — DILTIAZEM HYDROCHLORIDE 120 MG/1
120 CAPSULE, EXTENDED RELEASE ORAL DAILY
Status: DISCONTINUED | OUTPATIENT
Start: 2021-09-10 | End: 2021-09-12

## 2021-09-10 RX ORDER — METOCLOPRAMIDE HYDROCHLORIDE 5 MG/ML
10 INJECTION INTRAMUSCULAR; INTRAVENOUS EVERY 8 HOURS PRN
Status: DISCONTINUED | OUTPATIENT
Start: 2021-09-10 | End: 2021-09-12

## 2021-09-10 RX ORDER — DILTIAZEM HYDROCHLORIDE 120 MG/1
120 CAPSULE, EXTENDED RELEASE ORAL 2 TIMES DAILY
Status: DISCONTINUED | OUTPATIENT
Start: 2021-09-10 | End: 2021-09-10

## 2021-09-10 RX ORDER — ACETAMINOPHEN 10 MG/ML
1000 INJECTION, SOLUTION INTRAVENOUS EVERY 6 HOURS
Status: DISCONTINUED | OUTPATIENT
Start: 2021-09-10 | End: 2021-09-12

## 2021-09-11 ENCOUNTER — APPOINTMENT (OUTPATIENT)
Dept: CT IMAGING | Facility: HOSPITAL | Age: 81
DRG: 390 | End: 2021-09-11
Attending: SURGERY
Payer: MEDICARE

## 2021-09-11 ENCOUNTER — APPOINTMENT (OUTPATIENT)
Dept: MRI IMAGING | Facility: HOSPITAL | Age: 81
DRG: 390 | End: 2021-09-11
Attending: SURGERY
Payer: MEDICARE

## 2021-09-11 VITALS
HEART RATE: 48 BPM | HEIGHT: 65 IN | BODY MASS INDEX: 27.96 KG/M2 | OXYGEN SATURATION: 94 % | WEIGHT: 167.81 LBS | SYSTOLIC BLOOD PRESSURE: 126 MMHG | TEMPERATURE: 98 F | RESPIRATION RATE: 18 BRPM | DIASTOLIC BLOOD PRESSURE: 60 MMHG

## 2021-09-11 LAB
ALBUMIN SERPL-MCNC: 3.5 G/DL (ref 3.4–5)
ALBUMIN/GLOB SERPL: 1 {RATIO} (ref 1–2)
ALP LIVER SERPL-CCNC: 71 U/L
ALT SERPL-CCNC: 14 U/L
ANION GAP SERPL CALC-SCNC: 3 MMOL/L (ref 0–18)
AST SERPL-CCNC: 5 U/L (ref 15–37)
BASOPHILS # BLD AUTO: 0.02 X10(3) UL (ref 0–0.2)
BASOPHILS NFR BLD AUTO: 0.4 %
BILIRUB SERPL-MCNC: 0.4 MG/DL (ref 0.1–2)
BUN BLD-MCNC: 19 MG/DL (ref 7–18)
BUN/CREAT SERPL: 20 (ref 10–20)
CALCIUM BLD-MCNC: 8.9 MG/DL (ref 8.5–10.1)
CHLORIDE SERPL-SCNC: 103 MMOL/L (ref 98–112)
CO2 SERPL-SCNC: 33 MMOL/L (ref 21–32)
CREAT BLD-MCNC: 0.95 MG/DL
DEPRECATED RDW RBC AUTO: 47 FL (ref 35.1–46.3)
EOSINOPHIL # BLD AUTO: 0.02 X10(3) UL (ref 0–0.7)
EOSINOPHIL NFR BLD AUTO: 0.4 %
ERYTHROCYTE [DISTWIDTH] IN BLOOD BY AUTOMATED COUNT: 13.7 % (ref 11–15)
GLOBULIN PLAS-MCNC: 3.4 G/DL (ref 2.8–4.4)
GLUCOSE BLD-MCNC: 101 MG/DL (ref 70–99)
HCT VFR BLD AUTO: 31.4 %
HGB BLD-MCNC: 10 G/DL
IMM GRANULOCYTES # BLD AUTO: 0.01 X10(3) UL (ref 0–1)
IMM GRANULOCYTES NFR BLD: 0.2 %
LYMPHOCYTES # BLD AUTO: 0.65 X10(3) UL (ref 1–4)
LYMPHOCYTES NFR BLD AUTO: 12.5 %
M PROTEIN MFR SERPL ELPH: 6.9 G/DL (ref 6.4–8.2)
MAGNESIUM SERPL-MCNC: 2.1 MG/DL (ref 1.6–2.6)
MCH RBC QN AUTO: 29.7 PG (ref 26–34)
MCHC RBC AUTO-ENTMCNC: 31.8 G/DL (ref 31–37)
MCV RBC AUTO: 93.2 FL
MONOCYTES # BLD AUTO: 0.3 X10(3) UL (ref 0.1–1)
MONOCYTES NFR BLD AUTO: 5.8 %
NEUTROPHILS # BLD AUTO: 4.19 X10 (3) UL (ref 1.5–7.7)
NEUTROPHILS # BLD AUTO: 4.19 X10(3) UL (ref 1.5–7.7)
NEUTROPHILS NFR BLD AUTO: 80.7 %
OSMOLALITY SERPL CALC.SUM OF ELEC: 290 MOSM/KG (ref 275–295)
PHOSPHATE SERPL-MCNC: 3.8 MG/DL (ref 2.5–4.9)
PLATELET # BLD AUTO: 161 10(3)UL (ref 150–450)
POTASSIUM SERPL-SCNC: 4.7 MMOL/L (ref 3.5–5.1)
RBC # BLD AUTO: 3.37 X10(6)UL
SARS-COV-2 RNA RESP QL NAA+PROBE: NOT DETECTED
SODIUM SERPL-SCNC: 139 MMOL/L (ref 136–145)
WBC # BLD AUTO: 5.2 X10(3) UL (ref 4–11)

## 2021-09-11 PROCEDURE — 74177 CT ABD & PELVIS W/CONTRAST: CPT | Performed by: SURGERY

## 2021-09-11 PROCEDURE — 71260 CT THORAX DX C+: CPT | Performed by: SURGERY

## 2021-09-11 PROCEDURE — 72148 MRI LUMBAR SPINE W/O DYE: CPT | Performed by: SURGERY

## 2021-09-11 PROCEDURE — C9113 INJ PANTOPRAZOLE SODIUM, VIA: HCPCS | Performed by: SURGERY

## 2021-09-11 RX ORDER — LORAZEPAM 2 MG/ML
1 INJECTION INTRAMUSCULAR ONCE
Status: COMPLETED | OUTPATIENT
Start: 2021-09-11 | End: 2021-09-11

## 2021-09-11 RX ORDER — ENOXAPARIN SODIUM 100 MG/ML
40 INJECTION SUBCUTANEOUS DAILY
Status: DISCONTINUED | OUTPATIENT
Start: 2021-09-12 | End: 2021-09-12

## 2021-09-11 RX ORDER — ENOXAPARIN SODIUM 100 MG/ML
1 INJECTION SUBCUTANEOUS ONCE
Status: COMPLETED | OUTPATIENT
Start: 2021-09-11 | End: 2021-09-11

## 2021-09-11 NOTE — PROGRESS NOTES
Surgical Oncology Inpatient Progress Note    Subjective:  No acute events overnight. No nausea, persistent right upper quadrant discomfort.     Objective:  Temp:  [97.8 °F (36.6 °C)-98.1 °F (36.7 °C)] 97.8 °F (36.6 °C)  Pulse:  [51-63] 51  Resp:  [16-18] 1 Surgical Oncology  [facility]  Pager [phone]  [email or web address]

## 2021-09-11 NOTE — H&P (VIEW-ONLY)
[facility]    Patient Name:  Shoaib Max   YOB: 1940   Gender:  Female   Appt Date:  9/10/2021   Provider:  No name on file.    Insurance:  MEDICARE PART A&B     PATIENT PROVIDERS  Referring Provid disc disease with grade 1 anterolisthesis of L4-L5, L3-L4. She denies any fevers or chills, diarrhea. Of note, over the past several months the patient has lost around 20 pounds.   Surgical history is significant for an open cholecystectomy [remote] and v for chest pain and leg swelling. Gastrointestinal: Positive for abdominal distention, abdominal pain, nausea and vomiting. Endocrine: Negative for polydipsia and polyuria. Genitourinary: Negative for difficulty urinating and dysuria.    Tylor Pro apixaban  3. Bilateral lower extremity numbness and L3-L5 anterolisthesis  We will plan on obtaining MRI lumbar spine for additional work-up  4.   We will consult with hospitalist service in the morning    Brown Melgar MD  [facility]

## 2021-09-11 NOTE — PLAN OF CARE
Direct admit with nausea/vomiting x1 day. Patient is A&O x4. Scheduled IV tylenol for pain relief. Denied nausea overnight; no episodes of vomiting. IV fluids initiated and infusing through PIV placed prior to admission. Ambulating SBA and voiding freely. pain and request assistance  - Assess pain using appropriate pain scale  - Administer analgesics based on type and severity of pain and evaluate response  - Implement non-pharmacological measures as appropriate and evaluate response  - Consider cultural an Instruct patient on fluid and nutrition restrictions as appropriate  Outcome: Progressing     Problem: RISK FOR INFECTION - ADULT  Goal: Absence of fever/infection during anticipated neutropenic period  Description: INTERVENTIONS  - Monitor WBC  - Administ

## 2021-09-11 NOTE — H&P
[facility]    Patient Name:  Judit Alfaro   YOB: 1940   Gender:  Female   Appt Date:  9/10/2021   Provider:  No name on file.    Insurance:  MEDICARE PART A&B     PATIENT PROVIDERS  Referring Provid disc disease with grade 1 anterolisthesis of L4-L5, L3-L4. She denies any fevers or chills, diarrhea. Of note, over the past several months the patient has lost around 20 pounds.   Surgical history is significant for an open cholecystectomy [remote] and v for chest pain and leg swelling. Gastrointestinal: Positive for abdominal distention, abdominal pain, nausea and vomiting. Endocrine: Negative for polydipsia and polyuria. Genitourinary: Negative for difficulty urinating and dysuria.    Janel Heart apixaban  3. Bilateral lower extremity numbness and L3-L5 anterolisthesis  We will plan on obtaining MRI lumbar spine for additional work-up  4.   We will consult with hospitalist service in the morning    Pastora Castaneda MD  [facility]

## 2021-09-11 NOTE — PLAN OF CARE
Pt is alert/oriented. Drowsy at times throughout shift, easily arousable. Vitals stable, HR running low. MD aware. Tolerating diet, advanced to low fiber/soft. No complaints of pain, nausea, or vomiting. Ambulating well with standby assistance.  IVF infusin severity of pain and evaluate response  - Implement non-pharmacological measures as appropriate and evaluate response  - Consider cultural and social influences on pain and pain management  - Manage/alleviate anxiety  - Utilize distraction and/or relaxatio bowel function  - Maintain adequate hydration with IV or PO as ordered and tolerated  - Evaluate effectiveness of GI medications  - Encourage mobilization and activity  - Obtain nutritional consult as needed  - Establish a toileting routine/schedule  - Con

## 2021-09-12 NOTE — PROGRESS NOTES
Pt is alert and oriented x4. Lovenox given as ordered. Discharge paperwork was reviewed and given to pt and family. IV removed. All questions were answered.

## 2021-09-13 ENCOUNTER — TELEPHONE (OUTPATIENT)
Dept: SURGERY | Facility: CLINIC | Age: 81
End: 2021-09-13

## 2021-09-13 DIAGNOSIS — K56.609 SBO (SMALL BOWEL OBSTRUCTION) (HCC): Primary | ICD-10-CM

## 2021-09-13 DIAGNOSIS — D50.0 IRON DEFICIENCY ANEMIA DUE TO CHRONIC BLOOD LOSS: Primary | ICD-10-CM

## 2021-09-13 RX ORDER — FAMOTIDINE 20 MG/1
20 TABLET ORAL
COMMUNITY

## 2021-09-13 NOTE — TELEPHONE ENCOUNTER
Plan to proceed with surgery tomorrow:  Discussed with cardiogy over     [phone]  Att isha barnes    With respect to managing her medications:  1. Take Cardizem day of surgery  2.   Patient has been off Eliquis, stay off Eliquis, will recommend pre

## 2021-09-14 ENCOUNTER — ANESTHESIA EVENT (OUTPATIENT)
Dept: SURGERY | Facility: HOSPITAL | Age: 81
End: 2021-09-14
Payer: MEDICARE

## 2021-09-14 ENCOUNTER — HOSPITAL ENCOUNTER (OUTPATIENT)
Facility: HOSPITAL | Age: 81
Setting detail: HOSPITAL OUTPATIENT SURGERY
Discharge: HOME | End: 2021-09-14
Attending: SURGERY | Admitting: SURGERY
Payer: MEDICARE

## 2021-09-14 ENCOUNTER — ANESTHESIA (OUTPATIENT)
Dept: SURGERY | Facility: HOSPITAL | Age: 81
End: 2021-09-14
Payer: MEDICARE

## 2021-09-14 VITALS
RESPIRATION RATE: 16 BRPM | BODY MASS INDEX: 28.35 KG/M2 | HEIGHT: 63 IN | DIASTOLIC BLOOD PRESSURE: 54 MMHG | OXYGEN SATURATION: 94 % | WEIGHT: 160 LBS | HEART RATE: 57 BPM | TEMPERATURE: 98 F | SYSTOLIC BLOOD PRESSURE: 112 MMHG

## 2021-09-14 DIAGNOSIS — K56.609 SBO (SMALL BOWEL OBSTRUCTION) (HCC): ICD-10-CM

## 2021-09-14 PROCEDURE — 44180 LAP ENTEROLYSIS: CPT | Performed by: SURGERY

## 2021-09-14 PROCEDURE — 0DQV4ZZ REPAIR MESENTERY, PERCUTANEOUS ENDOSCOPIC APPROACH: ICD-10-PCS | Performed by: NEUROLOGICAL SURGERY

## 2021-09-14 PROCEDURE — 44180 LAP ENTEROLYSIS: CPT | Performed by: PHYSICIAN ASSISTANT

## 2021-09-14 PROCEDURE — 0DBV4ZX EXCISION OF MESENTERY, PERCUTANEOUS ENDOSCOPIC APPROACH, DIAGNOSTIC: ICD-10-PCS | Performed by: NEUROLOGICAL SURGERY

## 2021-09-14 RX ORDER — MORPHINE SULFATE 4 MG/ML
2 INJECTION, SOLUTION INTRAMUSCULAR; INTRAVENOUS EVERY 10 MIN PRN
Status: DISCONTINUED | OUTPATIENT
Start: 2021-09-14 | End: 2021-09-14

## 2021-09-14 RX ORDER — ONDANSETRON 2 MG/ML
INJECTION INTRAMUSCULAR; INTRAVENOUS AS NEEDED
Status: DISCONTINUED | OUTPATIENT
Start: 2021-09-14 | End: 2021-09-14 | Stop reason: SURG

## 2021-09-14 RX ORDER — CEFAZOLIN SODIUM/WATER 2 G/20 ML
2 SYRINGE (ML) INTRAVENOUS EVERY 8 HOURS
Status: DISCONTINUED | OUTPATIENT
Start: 2021-09-14 | End: 2021-09-14

## 2021-09-14 RX ORDER — HYDROMORPHONE HYDROCHLORIDE 1 MG/ML
0.4 INJECTION, SOLUTION INTRAMUSCULAR; INTRAVENOUS; SUBCUTANEOUS EVERY 5 MIN PRN
Status: DISCONTINUED | OUTPATIENT
Start: 2021-09-14 | End: 2021-09-14

## 2021-09-14 RX ORDER — HEPARIN SODIUM 5000 [USP'U]/ML
5000 INJECTION, SOLUTION INTRAVENOUS; SUBCUTANEOUS ONCE
Status: COMPLETED | OUTPATIENT
Start: 2021-09-14 | End: 2021-09-14

## 2021-09-14 RX ORDER — TRAMADOL HYDROCHLORIDE 50 MG/1
50 TABLET ORAL EVERY 6 HOURS PRN
Qty: 20 TABLET | Refills: 0 | Status: SHIPPED | OUTPATIENT
Start: 2021-09-14

## 2021-09-14 RX ORDER — MORPHINE SULFATE 10 MG/ML
6 INJECTION, SOLUTION INTRAMUSCULAR; INTRAVENOUS EVERY 10 MIN PRN
Status: DISCONTINUED | OUTPATIENT
Start: 2021-09-14 | End: 2021-09-14

## 2021-09-14 RX ORDER — NEOSTIGMINE METHYLSULFATE 1 MG/ML
INJECTION INTRAVENOUS AS NEEDED
Status: DISCONTINUED | OUTPATIENT
Start: 2021-09-14 | End: 2021-09-14 | Stop reason: SURG

## 2021-09-14 RX ORDER — ACETAMINOPHEN 500 MG
1000 TABLET ORAL ONCE
Status: COMPLETED | OUTPATIENT
Start: 2021-09-14 | End: 2021-09-14

## 2021-09-14 RX ORDER — CEFOXITIN 2 G/1
INJECTION, POWDER, FOR SOLUTION INTRAVENOUS AS NEEDED
Status: DISCONTINUED | OUTPATIENT
Start: 2021-09-14 | End: 2021-09-14

## 2021-09-14 RX ORDER — PROCHLORPERAZINE EDISYLATE 5 MG/ML
5 INJECTION INTRAMUSCULAR; INTRAVENOUS ONCE AS NEEDED
Status: DISCONTINUED | OUTPATIENT
Start: 2021-09-14 | End: 2021-09-14

## 2021-09-14 RX ORDER — MORPHINE SULFATE 4 MG/ML
4 INJECTION, SOLUTION INTRAMUSCULAR; INTRAVENOUS EVERY 10 MIN PRN
Status: DISCONTINUED | OUTPATIENT
Start: 2021-09-14 | End: 2021-09-14

## 2021-09-14 RX ORDER — HYDROCODONE BITARTRATE AND ACETAMINOPHEN 5; 325 MG/1; MG/1
2 TABLET ORAL AS NEEDED
Status: DISCONTINUED | OUTPATIENT
Start: 2021-09-14 | End: 2021-09-14

## 2021-09-14 RX ORDER — ONDANSETRON 2 MG/ML
4 INJECTION INTRAMUSCULAR; INTRAVENOUS ONCE AS NEEDED
Status: DISCONTINUED | OUTPATIENT
Start: 2021-09-14 | End: 2021-09-14

## 2021-09-14 RX ORDER — HALOPERIDOL 5 MG/ML
0.25 INJECTION INTRAMUSCULAR ONCE AS NEEDED
Status: DISCONTINUED | OUTPATIENT
Start: 2021-09-14 | End: 2021-09-14

## 2021-09-14 RX ORDER — NALOXONE HYDROCHLORIDE 0.4 MG/ML
80 INJECTION, SOLUTION INTRAMUSCULAR; INTRAVENOUS; SUBCUTANEOUS AS NEEDED
Status: DISCONTINUED | OUTPATIENT
Start: 2021-09-14 | End: 2021-09-14

## 2021-09-14 RX ORDER — SODIUM CHLORIDE, SODIUM LACTATE, POTASSIUM CHLORIDE, CALCIUM CHLORIDE 600; 310; 30; 20 MG/100ML; MG/100ML; MG/100ML; MG/100ML
INJECTION, SOLUTION INTRAVENOUS CONTINUOUS
Status: DISCONTINUED | OUTPATIENT
Start: 2021-09-14 | End: 2021-09-14

## 2021-09-14 RX ORDER — EPHEDRINE SULFATE 50 MG/ML
INJECTION, SOLUTION INTRAVENOUS AS NEEDED
Status: DISCONTINUED | OUTPATIENT
Start: 2021-09-14 | End: 2021-09-14 | Stop reason: SURG

## 2021-09-14 RX ORDER — HYDROMORPHONE HYDROCHLORIDE 1 MG/ML
0.2 INJECTION, SOLUTION INTRAMUSCULAR; INTRAVENOUS; SUBCUTANEOUS EVERY 5 MIN PRN
Status: DISCONTINUED | OUTPATIENT
Start: 2021-09-14 | End: 2021-09-14

## 2021-09-14 RX ORDER — HYDROCODONE BITARTRATE AND ACETAMINOPHEN 5; 325 MG/1; MG/1
1 TABLET ORAL AS NEEDED
Status: DISCONTINUED | OUTPATIENT
Start: 2021-09-14 | End: 2021-09-14

## 2021-09-14 RX ORDER — HYDROMORPHONE HYDROCHLORIDE 1 MG/ML
0.6 INJECTION, SOLUTION INTRAMUSCULAR; INTRAVENOUS; SUBCUTANEOUS EVERY 5 MIN PRN
Status: DISCONTINUED | OUTPATIENT
Start: 2021-09-14 | End: 2021-09-14

## 2021-09-14 RX ORDER — METOPROLOL TARTRATE 5 MG/5ML
2.5 INJECTION INTRAVENOUS ONCE
Status: COMPLETED | OUTPATIENT
Start: 2021-09-14 | End: 2021-09-14

## 2021-09-14 RX ORDER — ROCURONIUM BROMIDE 10 MG/ML
INJECTION, SOLUTION INTRAVENOUS AS NEEDED
Status: DISCONTINUED | OUTPATIENT
Start: 2021-09-14 | End: 2021-09-14 | Stop reason: SURG

## 2021-09-14 RX ORDER — LIDOCAINE HYDROCHLORIDE 40 MG/ML
SOLUTION TOPICAL AS NEEDED
Status: DISCONTINUED | OUTPATIENT
Start: 2021-09-14 | End: 2021-09-14 | Stop reason: SURG

## 2021-09-14 RX ORDER — DEXAMETHASONE SODIUM PHOSPHATE 4 MG/ML
VIAL (ML) INJECTION AS NEEDED
Status: DISCONTINUED | OUTPATIENT
Start: 2021-09-14 | End: 2021-09-14 | Stop reason: SURG

## 2021-09-14 RX ORDER — CEFOXITIN 2 G/1
INJECTION, POWDER, FOR SOLUTION INTRAVENOUS AS NEEDED
Status: DISCONTINUED | OUTPATIENT
Start: 2021-09-14 | End: 2021-09-14 | Stop reason: SURG

## 2021-09-14 RX ORDER — GLYCOPYRROLATE 0.2 MG/ML
INJECTION, SOLUTION INTRAMUSCULAR; INTRAVENOUS AS NEEDED
Status: DISCONTINUED | OUTPATIENT
Start: 2021-09-14 | End: 2021-09-14 | Stop reason: SURG

## 2021-09-14 RX ORDER — LIDOCAINE HYDROCHLORIDE 10 MG/ML
INJECTION, SOLUTION EPIDURAL; INFILTRATION; INTRACAUDAL; PERINEURAL AS NEEDED
Status: DISCONTINUED | OUTPATIENT
Start: 2021-09-14 | End: 2021-09-14 | Stop reason: SURG

## 2021-09-14 RX ADMIN — GLYCOPYRROLATE 0.6 MG: 0.2 INJECTION, SOLUTION INTRAMUSCULAR; INTRAVENOUS at 12:17:00

## 2021-09-14 RX ADMIN — SODIUM CHLORIDE, SODIUM LACTATE, POTASSIUM CHLORIDE, CALCIUM CHLORIDE: 600; 310; 30; 20 INJECTION, SOLUTION INTRAVENOUS at 10:33:00

## 2021-09-14 RX ADMIN — ONDANSETRON 4 MG: 2 INJECTION INTRAMUSCULAR; INTRAVENOUS at 10:45:00

## 2021-09-14 RX ADMIN — NEOSTIGMINE METHYLSULFATE 3 MG: 1 INJECTION INTRAVENOUS at 12:17:00

## 2021-09-14 RX ADMIN — EPHEDRINE SULFATE 10 MG: 50 INJECTION, SOLUTION INTRAVENOUS at 10:50:00

## 2021-09-14 RX ADMIN — LIDOCAINE HYDROCHLORIDE 25 MG: 10 INJECTION, SOLUTION EPIDURAL; INFILTRATION; INTRACAUDAL; PERINEURAL at 10:30:00

## 2021-09-14 RX ADMIN — ROCURONIUM BROMIDE 30 MG: 10 INJECTION, SOLUTION INTRAVENOUS at 10:58:00

## 2021-09-14 RX ADMIN — SODIUM CHLORIDE, SODIUM LACTATE, POTASSIUM CHLORIDE, CALCIUM CHLORIDE: 600; 310; 30; 20 INJECTION, SOLUTION INTRAVENOUS at 12:25:00

## 2021-09-14 RX ADMIN — CEFOXITIN 2 G: 2 INJECTION, POWDER, FOR SOLUTION INTRAVENOUS at 10:40:00

## 2021-09-14 RX ADMIN — LIDOCAINE HYDROCHLORIDE 4 ML: 40 SOLUTION TOPICAL at 10:30:00

## 2021-09-14 RX ADMIN — DEXAMETHASONE SODIUM PHOSPHATE 4 MG: 4 MG/ML VIAL (ML) INJECTION at 10:45:00

## 2021-09-14 NOTE — OPERATIVE REPORT
Date of operation 9/14/2021    Preoperative diagnosis:  1. Chronic small bowel obstruction with recurrent symptoms    Operation performed:  1. Diagnostic laparoscopy  2. Reduction of internal hernia  3.   Biopsy of mesenteric nodule    Postoperative diag Pneumoperitoneum was established. 2 additional 5 mm ports were placed in the left hemiabdomen. Patient was then positioned to Trendelenburg. The bowel was run from the terminal ileum proximally.   An internal hernia was noted as the small intestine dove skin was closed in a subcuticular manner using 4-0 Vicryl. Steri-Strips and sterile dressings were applied. The patient tolerated the procedure well, was awakened and was taken to the postoperative anesthesia care unit in a stable state.   I was present

## 2021-09-14 NOTE — ANESTHESIA PREPROCEDURE EVALUATION
Anesthesia PreOp Note    HPI:     Sabino Alcala is a 80year old female who presents for preoperative consultation requested by: Alan Guerrero MD    Date of Surgery: 9/14/2021    Procedure(s):  Diagnostic laparoscopy, possible laparotomy, and bowel resec 9/14/2021 at 0800  acetaminophen 500 MG Oral Tab, Take 1,000 mg by mouth every 6 (six) hours as needed. , Disp: , Rfl: , Past Week at Unknown time  dilTIAZem HCl ER Coated Beads 120 MG Oral Capsule SR 24 Hr, Take 120 mg by mouth daily. , Disp: , Rfl: , 9/14/ Mother      Social History    Socioeconomic History      Marital status:       Spouse name: Not on file      Number of children: Not on file      Years of education: Not on file      Highest education level: Not on file    Occupational History WBC 5.2 09/10/2021    RBC 3.37 (L) 09/10/2021    HGB 10.0 (L) 09/10/2021    HCT 31.4 (L) 09/10/2021    MCV 93.2 09/10/2021    MCH 29.7 09/10/2021    MCHC 31.8 09/10/2021    RDW 13.7 09/10/2021    .0 09/10/2021     Lab Results   Component Value Date

## 2021-09-14 NOTE — ANESTHESIA PROCEDURE NOTES
Peripheral IV  Date/Time: 9/14/2021 10:48 AM  Inserted by: Sherwin Arteaga MD    Placement  Needle size: 18 G  Laterality: left  Location: antecubital  Local anesthetic: none  Site prep: alcohol  Technique: anatomical landmarks  Attempts: 1

## 2021-09-14 NOTE — INTERVAL H&P NOTE
Patient seen and examined. No changes to the attached history and physical are noted. 80year old female presenting today for planned diagnostic laparoscopy, possible laparotomy, possible bowel resection.     Rebecca Feliz PA-C    Department of Surg

## 2021-09-14 NOTE — ANESTHESIA PROCEDURE NOTES
Airway  Date/Time: 9/14/2021 10:32 AM  Urgency: Elective      General Information and Staff    Patient location during procedure: OR  Anesthesiologist: Sander Arnold MD  Performed: anesthesiologist     Indications and Patient Condition  Indications for ai

## 2021-09-14 NOTE — ANESTHESIA POSTPROCEDURE EVALUATION
Patient: Yoly Carpenter    Procedure Summary     Date: 09/14/21 Room / Location: [address] [place] [address] MAIN OR 08 / [address] [place] [address] MAIN OR    Anesthesia Start: 1027 Anesthesia Stop: 4166    Procedure: Diagnostic laparoscopy, lysis of adhesions, biopsy of mesenteric nodule, reduction of

## 2021-09-14 NOTE — BRIEF OP NOTE
Pre-Operative Diagnosis: SBO (small bowel obstruction) ([facility] [K56.609]     Post-[facility]ative Diagnosis[facility] [K56.609]      Procedure Performed:   Diagnostic laparoscopy, lysis of adhesions, biopsy of mesenteric nodule, reductio

## 2021-09-14 NOTE — PROGRESS NOTES
Upon arrival in PACU  Patient reports 'not feeling good\" EKG with mild ST depression. Lopressor 2.5 mg given. 12 lead EKG unremarkable.   Vitals remained stable

## 2021-09-15 ENCOUNTER — TELEPHONE (OUTPATIENT)
Dept: SURGERY | Facility: CLINIC | Age: 81
End: 2021-09-15

## 2021-09-15 NOTE — TELEPHONE ENCOUNTER
Post op call made to patient. She is doing well. Pain is controlled. Denies fevers, chills, nausea, vomiting, problems with incisions. We will plan on seeing her next week in clinic. She would like to discuss with her son for day/time.      Carlito Nelson

## 2022-03-21 ENCOUNTER — APPOINTMENT (OUTPATIENT)
Dept: GENERAL RADIOLOGY | Facility: HOSPITAL | Age: 82
End: 2022-03-21
Attending: EMERGENCY MEDICINE
Payer: MEDICARE

## 2022-03-21 ENCOUNTER — HOSPITAL ENCOUNTER (EMERGENCY)
Facility: HOSPITAL | Age: 82
Discharge: HOME | End: 2022-03-21
Attending: EMERGENCY MEDICINE
Payer: MEDICARE

## 2022-03-21 ENCOUNTER — APPOINTMENT (OUTPATIENT)
Dept: CT IMAGING | Facility: HOSPITAL | Age: 82
End: 2022-03-21
Attending: EMERGENCY MEDICINE
Payer: MEDICARE

## 2022-03-21 VITALS
WEIGHT: 160 LBS | BODY MASS INDEX: 28.35 KG/M2 | HEART RATE: 68 BPM | HEIGHT: 63 IN | DIASTOLIC BLOOD PRESSURE: 74 MMHG | RESPIRATION RATE: 18 BRPM | SYSTOLIC BLOOD PRESSURE: 137 MMHG | OXYGEN SATURATION: 98 % | TEMPERATURE: 99 F

## 2022-03-21 DIAGNOSIS — S80.01XA CONTUSION OF RIGHT KNEE, INITIAL ENCOUNTER: Primary | ICD-10-CM

## 2022-03-21 DIAGNOSIS — S20.211A CONTUSION OF RIB ON RIGHT SIDE, INITIAL ENCOUNTER: ICD-10-CM

## 2022-03-21 LAB
ALBUMIN SERPL-MCNC: 3.6 G/DL (ref 3.4–5)
ALBUMIN/GLOB SERPL: 1 {RATIO} (ref 1–2)
ALP LIVER SERPL-CCNC: 76 U/L
ALT SERPL-CCNC: 13 U/L
ANION GAP SERPL CALC-SCNC: 3 MMOL/L (ref 0–18)
AST SERPL-CCNC: 15 U/L (ref 15–37)
BASOPHILS # BLD AUTO: 0.03 X10(3) UL (ref 0–0.2)
BASOPHILS NFR BLD AUTO: 0.6 %
BILIRUB SERPL-MCNC: 0.4 MG/DL (ref 0.1–2)
BILIRUB UR QL STRIP.AUTO: NEGATIVE
BUN BLD-MCNC: 28 MG/DL (ref 7–18)
CALCIUM BLD-MCNC: 9.1 MG/DL (ref 8.5–10.1)
CHLORIDE SERPL-SCNC: 105 MMOL/L (ref 98–112)
CO2 SERPL-SCNC: 30 MMOL/L (ref 21–32)
CREAT BLD-MCNC: 0.89 MG/DL
EOSINOPHIL # BLD AUTO: 0.03 X10(3) UL (ref 0–0.7)
EOSINOPHIL NFR BLD AUTO: 0.6 %
ERYTHROCYTE [DISTWIDTH] IN BLOOD BY AUTOMATED COUNT: 14.1 %
GLOBULIN PLAS-MCNC: 3.7 G/DL (ref 2.8–4.4)
GLUCOSE UR STRIP.AUTO-MCNC: NEGATIVE MG/DL
HGB BLD-MCNC: 9.5 G/DL
IMM GRANULOCYTES # BLD AUTO: 0.02 X10(3) UL (ref 0–1)
IMM GRANULOCYTES NFR BLD: 0.4 %
KETONES UR STRIP.AUTO-MCNC: NEGATIVE MG/DL
LYMPHOCYTES # BLD AUTO: 0.76 X10(3) UL (ref 1–4)
LYMPHOCYTES NFR BLD AUTO: 15.8 %
MCH RBC QN AUTO: 29.1 PG (ref 26–34)
MCHC RBC AUTO-ENTMCNC: 31.8 G/DL (ref 31–37)
MCV RBC AUTO: 91.4 FL
MONOCYTES # BLD AUTO: 0.38 X10(3) UL (ref 0.1–1)
MONOCYTES NFR BLD AUTO: 7.9 %
NEUTROPHILS # BLD AUTO: 3.58 X10 (3) UL (ref 1.5–7.7)
NEUTROPHILS # BLD AUTO: 3.58 X10(3) UL (ref 1.5–7.7)
NEUTROPHILS NFR BLD AUTO: 74.7 %
NITRITE UR QL STRIP.AUTO: NEGATIVE
OSMOLALITY SERPL CALC.SUM OF ELEC: 292 MOSM/KG (ref 275–295)
P AXIS: 66 DEGREES
P-R INTERVAL: 164 MS
PH UR STRIP.AUTO: 8 [PH] (ref 5–8)
PLATELET # BLD AUTO: 150 10(3)UL (ref 150–450)
POTASSIUM SERPL-SCNC: 5 MMOL/L (ref 3.5–5.1)
PROT SERPL-MCNC: 7.3 G/DL (ref 6.4–8.2)
PROT UR STRIP.AUTO-MCNC: NEGATIVE MG/DL
Q-T INTERVAL: 458 MS
QRS DURATION: 74 MS
QTC CALCULATION (BEZET): 464 MS
R AXIS: 49 DEGREES
RBC # BLD AUTO: 3.27 X10(6)UL
SODIUM SERPL-SCNC: 138 MMOL/L (ref 136–145)
SP GR UR STRIP.AUTO: 1.01 (ref 1–1.03)
T AXIS: -11 DEGREES
TROPONIN I HIGH SENSITIVITY: 11 NG/L
UROBILINOGEN UR STRIP.AUTO-MCNC: <2 MG/DL
VENTRICULAR RATE: 62 BPM
WBC # BLD AUTO: 4.8 X10(3) UL (ref 4–11)

## 2022-03-21 PROCEDURE — 99284 EMERGENCY DEPT VISIT MOD MDM: CPT

## 2022-03-21 PROCEDURE — 81001 URINALYSIS AUTO W/SCOPE: CPT | Performed by: EMERGENCY MEDICINE

## 2022-03-21 PROCEDURE — 99285 EMERGENCY DEPT VISIT HI MDM: CPT

## 2022-03-21 PROCEDURE — 80053 COMPREHEN METABOLIC PANEL: CPT | Performed by: EMERGENCY MEDICINE

## 2022-03-21 PROCEDURE — 93010 ELECTROCARDIOGRAM REPORT: CPT

## 2022-03-21 PROCEDURE — 93005 ELECTROCARDIOGRAM TRACING: CPT

## 2022-03-21 PROCEDURE — 36415 COLL VENOUS BLD VENIPUNCTURE: CPT

## 2022-03-21 PROCEDURE — 70450 CT HEAD/BRAIN W/O DYE: CPT | Performed by: EMERGENCY MEDICINE

## 2022-03-21 PROCEDURE — 87086 URINE CULTURE/COLONY COUNT: CPT | Performed by: EMERGENCY MEDICINE

## 2022-03-21 PROCEDURE — 84484 ASSAY OF TROPONIN QUANT: CPT | Performed by: EMERGENCY MEDICINE

## 2022-03-21 PROCEDURE — 71101 X-RAY EXAM UNILAT RIBS/CHEST: CPT | Performed by: EMERGENCY MEDICINE

## 2022-03-21 PROCEDURE — 73560 X-RAY EXAM OF KNEE 1 OR 2: CPT | Performed by: EMERGENCY MEDICINE

## 2022-03-21 PROCEDURE — 85025 COMPLETE CBC W/AUTO DIFF WBC: CPT | Performed by: EMERGENCY MEDICINE

## 2022-03-21 NOTE — ED INITIAL ASSESSMENT (HPI)
PT PRESENTS TO ED AFTER SHE FELL LAST NIGHT, PT IS ON BLOOD THINNER, PER DR. EVANS PT DOES NOT FEEL RIGHT IN HEAD TODAY.   PT IS \"GROGGY\"  PT STATES SHE FEELS \"EMPTINESS\"  UNKNOWN LOC, UNKNOWN IF SHE HIT HER HEAD

## 2022-04-07 ENCOUNTER — LAB ENCOUNTER (OUTPATIENT)
Dept: LAB | Facility: HOSPITAL | Age: 82
End: 2022-04-07
Attending: PHYSICIAN ASSISTANT
Payer: MEDICARE

## 2022-04-07 ENCOUNTER — HOSPITAL ENCOUNTER (OUTPATIENT)
Facility: HOSPITAL | Age: 82
Setting detail: HOSPITAL OUTPATIENT SURGERY
Discharge: HOME | End: 2022-04-07
Attending: INTERNAL MEDICINE | Admitting: INTERNAL MEDICINE
Payer: MEDICARE

## 2022-04-07 VITALS
BODY MASS INDEX: 28.35 KG/M2 | HEART RATE: 55 BPM | SYSTOLIC BLOOD PRESSURE: 122 MMHG | DIASTOLIC BLOOD PRESSURE: 58 MMHG | OXYGEN SATURATION: 97 % | HEIGHT: 63 IN | WEIGHT: 160 LBS | RESPIRATION RATE: 16 BRPM

## 2022-04-07 DIAGNOSIS — R93.3 ABNORMAL FINDING ON GI TRACT IMAGING: ICD-10-CM

## 2022-04-07 DIAGNOSIS — E03.9 HYPOTHYROIDISM, UNSPECIFIED TYPE: ICD-10-CM

## 2022-04-07 DIAGNOSIS — E53.8 B12 DEFICIENCY: ICD-10-CM

## 2022-04-07 DIAGNOSIS — D50.9 IRON DEFICIENCY ANEMIA, UNSPECIFIED IRON DEFICIENCY ANEMIA TYPE: ICD-10-CM

## 2022-04-07 LAB
DEPRECATED HBV CORE AB SER IA-ACNC: 25.2 NG/ML
IRON SATN MFR SERPL: 11 %
IRON SERPL-MCNC: 53 UG/DL
TIBC SERPL-MCNC: 465 UG/DL (ref 240–450)
TRANSFERRIN SERPL-MCNC: 312 MG/DL (ref 200–360)
TSI SER-ACNC: 2.12 MIU/ML (ref 0.36–3.74)
VIT B12 SERPL-MCNC: 596 PG/ML (ref 193–986)

## 2022-04-07 PROCEDURE — 83540 ASSAY OF IRON: CPT

## 2022-04-07 PROCEDURE — 84443 ASSAY THYROID STIM HORMONE: CPT

## 2022-04-07 PROCEDURE — 82607 VITAMIN B-12: CPT

## 2022-04-07 PROCEDURE — 36415 COLL VENOUS BLD VENIPUNCTURE: CPT

## 2022-04-07 PROCEDURE — 82728 ASSAY OF FERRITIN: CPT

## 2022-04-07 PROCEDURE — 0DJD8ZZ INSPECTION OF LOWER INTESTINAL TRACT, VIA NATURAL OR ARTIFICIAL OPENING ENDOSCOPIC: ICD-10-PCS | Performed by: INTERNAL MEDICINE

## 2022-04-07 PROCEDURE — 84466 ASSAY OF TRANSFERRIN: CPT

## 2022-04-07 RX ORDER — SODIUM CHLORIDE, SODIUM LACTATE, POTASSIUM CHLORIDE, CALCIUM CHLORIDE 600; 310; 30; 20 MG/100ML; MG/100ML; MG/100ML; MG/100ML
INJECTION, SOLUTION INTRAVENOUS CONTINUOUS
Status: DISCONTINUED | OUTPATIENT
Start: 2022-04-07 | End: 2022-04-07

## 2022-04-07 NOTE — OPERATIVE REPORT
PATIENT NAME: Annemarie Mccord  MRN: [id number]  DATE OF OPERATION: 4/7/2022  PREOPERATIVE DIAGNOSIS:   Diverticulosis, history of bowel obstruction, scleroderma, CT scan showed focal thickening of the mid sigmoid. The patient underwent a colonoscopy by myself in July 2020 without obvious mass lesion. POSTOPERATIVE DIAGNOSES:  1. Poor colon prep. 2. Diverticulosis  PROCEDURE PERFORMED: Flexible sigmoidoscopy  SURGEON: Dr. Mechelle Bustamante: None  Anesthesia; none  Specimen: None  QUALITY OF PREP: Poor  CONSENT: Informed and obtained from the patient  COMPLICATIONS: None immediately apparent  PROCEDURE AND FINDINGS:   After the risks and benefits of the procedure were discussed with the patient and all questions were answered, the patient signed informed consent for the procedure. She was placed in the left lateral position and once an adequate level of  sedation was achieved, a digital rectal exam was performed which revealed no obvious perianal disease or palpable masses within the anal canal. The lubricated tip of an Olympus video pediatric colonoscope was introduced into the anus and advanced through the colon to the mid to distal sigmoid. Large amount of solid fecal material was encountered. Significant diverticulosis was seen. No obvious stricture or mass lesion noted. However cannot assess and examined the mucosa well. The scope was then removed. IMPRESSION:  1. Findings described above    RECOMMENDATIONS:  1. Consider repeat flexible sigmoidoscopy with colon prep versus barium enema. However the narrowing described on the CT scan is most likely artifact. The above was discussed with her son Dr. Maia Suarez. The patient and the family will let us know what they would like to do. 2. MiraLAX 1 capful to start at night and titrate up to 2 capfuls as needed. 3. Bentyl to take 1 tablet 4 to 6 hours as needed. Geri Bernabe M.D.   [facility]

## 2022-04-26 RX ORDER — CYANOCOBALAMIN 1000 UG/ML
INJECTION INTRAMUSCULAR; SUBCUTANEOUS
Qty: 3 ML | Refills: 0 | Status: SHIPPED | OUTPATIENT
Start: 2022-04-26

## 2022-11-15 DIAGNOSIS — D51.3 OTHER DIETARY VITAMIN B12 DEFICIENCY ANEMIA: ICD-10-CM

## 2022-11-15 RX ORDER — CYANOCOBALAMIN 1000 UG/ML
1000 INJECTION, SOLUTION INTRAMUSCULAR; SUBCUTANEOUS
Qty: 3 ML | Refills: 3 | Status: SHIPPED | OUTPATIENT
Start: 2022-11-15

## 2023-02-09 ENCOUNTER — ANESTHESIA EVENT (OUTPATIENT)
Dept: SURGERY | Facility: HOSPITAL | Age: 83
End: 2023-02-09
Payer: MEDICARE

## 2023-02-09 ENCOUNTER — HOSPITAL ENCOUNTER (OUTPATIENT)
Facility: HOSPITAL | Age: 83
Setting detail: HOSPITAL OUTPATIENT SURGERY
Discharge: HOME | End: 2023-02-09
Attending: SURGERY | Admitting: SURGERY
Payer: MEDICARE

## 2023-02-09 ENCOUNTER — ANESTHESIA (OUTPATIENT)
Dept: SURGERY | Facility: HOSPITAL | Age: 83
End: 2023-02-09
Payer: MEDICARE

## 2023-02-09 VITALS
DIASTOLIC BLOOD PRESSURE: 51 MMHG | TEMPERATURE: 98 F | BODY MASS INDEX: 24.8 KG/M2 | OXYGEN SATURATION: 95 % | RESPIRATION RATE: 12 BRPM | SYSTOLIC BLOOD PRESSURE: 110 MMHG | HEIGHT: 63 IN | HEART RATE: 56 BPM | WEIGHT: 140 LBS

## 2023-02-09 DIAGNOSIS — Z01.818 PREOP TESTING: Primary | ICD-10-CM

## 2023-02-09 LAB — SARS-COV-2 RNA RESP QL NAA+PROBE: NOT DETECTED

## 2023-02-09 PROCEDURE — 0YU54JZ SUPPLEMENT RIGHT INGUINAL REGION WITH SYNTHETIC SUBSTITUTE, PERCUTANEOUS ENDOSCOPIC APPROACH: ICD-10-PCS | Performed by: SURGERY

## 2023-02-09 PROCEDURE — 8E0W4CZ ROBOTIC ASSISTED PROCEDURE OF TRUNK REGION, PERCUTANEOUS ENDOSCOPIC APPROACH: ICD-10-PCS | Performed by: SURGERY

## 2023-02-09 DEVICE — PROGRIP MESH: Type: IMPLANTABLE DEVICE | Site: ABDOMEN | Status: FUNCTIONAL

## 2023-02-09 RX ORDER — HYDROMORPHONE HYDROCHLORIDE 1 MG/ML
0.4 INJECTION, SOLUTION INTRAMUSCULAR; INTRAVENOUS; SUBCUTANEOUS EVERY 5 MIN PRN
Status: DISCONTINUED | OUTPATIENT
Start: 2023-02-09 | End: 2023-02-09

## 2023-02-09 RX ORDER — ACETAMINOPHEN 500 MG
1000 TABLET ORAL ONCE
Status: COMPLETED | OUTPATIENT
Start: 2023-02-09 | End: 2023-02-09

## 2023-02-09 RX ORDER — DEXAMETHASONE SODIUM PHOSPHATE 4 MG/ML
VIAL (ML) INJECTION AS NEEDED
Status: DISCONTINUED | OUTPATIENT
Start: 2023-02-09 | End: 2023-02-09 | Stop reason: SURG

## 2023-02-09 RX ORDER — NALOXONE HYDROCHLORIDE 0.4 MG/ML
80 INJECTION, SOLUTION INTRAMUSCULAR; INTRAVENOUS; SUBCUTANEOUS AS NEEDED
Status: DISCONTINUED | OUTPATIENT
Start: 2023-02-09 | End: 2023-02-09

## 2023-02-09 RX ORDER — MORPHINE SULFATE 10 MG/ML
6 INJECTION, SOLUTION INTRAMUSCULAR; INTRAVENOUS EVERY 10 MIN PRN
Status: DISCONTINUED | OUTPATIENT
Start: 2023-02-09 | End: 2023-02-09

## 2023-02-09 RX ORDER — MORPHINE SULFATE 4 MG/ML
2 INJECTION, SOLUTION INTRAMUSCULAR; INTRAVENOUS EVERY 10 MIN PRN
Status: DISCONTINUED | OUTPATIENT
Start: 2023-02-09 | End: 2023-02-09

## 2023-02-09 RX ORDER — DOCUSATE SODIUM 100 MG/1
100 CAPSULE, LIQUID FILLED ORAL 2 TIMES DAILY
Qty: 14 CAPSULE | Refills: 1 | Status: SHIPPED | OUTPATIENT
Start: 2023-02-09 | End: 2023-02-16

## 2023-02-09 RX ORDER — SODIUM CHLORIDE, SODIUM LACTATE, POTASSIUM CHLORIDE, CALCIUM CHLORIDE 600; 310; 30; 20 MG/100ML; MG/100ML; MG/100ML; MG/100ML
INJECTION, SOLUTION INTRAVENOUS CONTINUOUS
Status: DISCONTINUED | OUTPATIENT
Start: 2023-02-09 | End: 2023-02-09

## 2023-02-09 RX ORDER — TRAMADOL HYDROCHLORIDE 50 MG/1
50 TABLET ORAL EVERY 6 HOURS PRN
Qty: 10 TABLET | Refills: 0 | Status: SHIPPED | OUTPATIENT
Start: 2023-02-09

## 2023-02-09 RX ORDER — SODIUM CHLORIDE 9 MG/ML
INJECTION, SOLUTION INTRAVENOUS CONTINUOUS PRN
Status: DISCONTINUED | OUTPATIENT
Start: 2023-02-09 | End: 2023-02-09 | Stop reason: SURG

## 2023-02-09 RX ORDER — ONDANSETRON 2 MG/ML
4 INJECTION INTRAMUSCULAR; INTRAVENOUS EVERY 6 HOURS PRN
Status: DISCONTINUED | OUTPATIENT
Start: 2023-02-09 | End: 2023-02-09

## 2023-02-09 RX ORDER — ROCURONIUM BROMIDE 10 MG/ML
INJECTION, SOLUTION INTRAVENOUS AS NEEDED
Status: DISCONTINUED | OUTPATIENT
Start: 2023-02-09 | End: 2023-02-09 | Stop reason: SURG

## 2023-02-09 RX ORDER — LIDOCAINE HYDROCHLORIDE 10 MG/ML
INJECTION, SOLUTION EPIDURAL; INFILTRATION; INTRACAUDAL; PERINEURAL AS NEEDED
Status: DISCONTINUED | OUTPATIENT
Start: 2023-02-09 | End: 2023-02-09 | Stop reason: SURG

## 2023-02-09 RX ORDER — CEFAZOLIN SODIUM/WATER 2 G/20 ML
2 SYRINGE (ML) INTRAVENOUS ONCE
Status: COMPLETED | OUTPATIENT
Start: 2023-02-09 | End: 2023-02-09

## 2023-02-09 RX ORDER — HYDROMORPHONE HYDROCHLORIDE 1 MG/ML
0.2 INJECTION, SOLUTION INTRAMUSCULAR; INTRAVENOUS; SUBCUTANEOUS EVERY 5 MIN PRN
Status: DISCONTINUED | OUTPATIENT
Start: 2023-02-09 | End: 2023-02-09

## 2023-02-09 RX ORDER — TRAMADOL HYDROCHLORIDE 50 MG/1
50 TABLET ORAL ONCE
Status: COMPLETED | OUTPATIENT
Start: 2023-02-09 | End: 2023-02-09

## 2023-02-09 RX ORDER — HYDROMORPHONE HYDROCHLORIDE 1 MG/ML
0.6 INJECTION, SOLUTION INTRAMUSCULAR; INTRAVENOUS; SUBCUTANEOUS EVERY 5 MIN PRN
Status: DISCONTINUED | OUTPATIENT
Start: 2023-02-09 | End: 2023-02-09

## 2023-02-09 RX ORDER — EPHEDRINE SULFATE 50 MG/ML
INJECTION, SOLUTION INTRAVENOUS AS NEEDED
Status: DISCONTINUED | OUTPATIENT
Start: 2023-02-09 | End: 2023-02-09 | Stop reason: SURG

## 2023-02-09 RX ORDER — ONDANSETRON 2 MG/ML
INJECTION INTRAMUSCULAR; INTRAVENOUS AS NEEDED
Status: DISCONTINUED | OUTPATIENT
Start: 2023-02-09 | End: 2023-02-09 | Stop reason: SURG

## 2023-02-09 RX ORDER — BUPIVACAINE HYDROCHLORIDE AND EPINEPHRINE 2.5; 5 MG/ML; UG/ML
INJECTION, SOLUTION INFILTRATION; PERINEURAL AS NEEDED
Status: DISCONTINUED | OUTPATIENT
Start: 2023-02-09 | End: 2023-02-09 | Stop reason: HOSPADM

## 2023-02-09 RX ORDER — MORPHINE SULFATE 4 MG/ML
4 INJECTION, SOLUTION INTRAMUSCULAR; INTRAVENOUS EVERY 10 MIN PRN
Status: DISCONTINUED | OUTPATIENT
Start: 2023-02-09 | End: 2023-02-09

## 2023-02-09 RX ORDER — METOCLOPRAMIDE HYDROCHLORIDE 5 MG/ML
10 INJECTION INTRAMUSCULAR; INTRAVENOUS EVERY 8 HOURS PRN
Status: DISCONTINUED | OUTPATIENT
Start: 2023-02-09 | End: 2023-02-09

## 2023-02-09 RX ADMIN — ROCURONIUM BROMIDE 10 MG: 10 INJECTION, SOLUTION INTRAVENOUS at 08:34:00

## 2023-02-09 RX ADMIN — ONDANSETRON 4 MG: 2 INJECTION INTRAMUSCULAR; INTRAVENOUS at 09:24:00

## 2023-02-09 RX ADMIN — SODIUM CHLORIDE: 9 INJECTION, SOLUTION INTRAVENOUS at 07:49:00

## 2023-02-09 RX ADMIN — EPHEDRINE SULFATE 10 MG: 50 INJECTION, SOLUTION INTRAVENOUS at 07:47:00

## 2023-02-09 RX ADMIN — LIDOCAINE HYDROCHLORIDE 50 MG: 10 INJECTION, SOLUTION EPIDURAL; INFILTRATION; INTRACAUDAL; PERINEURAL at 07:42:00

## 2023-02-09 RX ADMIN — ROCURONIUM BROMIDE 5 MG: 10 INJECTION, SOLUTION INTRAVENOUS at 09:13:00

## 2023-02-09 RX ADMIN — SODIUM CHLORIDE, SODIUM LACTATE, POTASSIUM CHLORIDE, CALCIUM CHLORIDE: 600; 310; 30; 20 INJECTION, SOLUTION INTRAVENOUS at 09:27:00

## 2023-02-09 RX ADMIN — EPHEDRINE SULFATE 10 MG: 50 INJECTION, SOLUTION INTRAVENOUS at 08:11:00

## 2023-02-09 RX ADMIN — DEXAMETHASONE SODIUM PHOSPHATE 4 MG: 4 MG/ML VIAL (ML) INJECTION at 08:05:00

## 2023-02-09 RX ADMIN — ROCURONIUM BROMIDE 50 MG: 10 INJECTION, SOLUTION INTRAVENOUS at 07:49:00

## 2023-02-09 RX ADMIN — CEFAZOLIN SODIUM/WATER 2 G: 2 G/20 ML SYRINGE (ML) INTRAVENOUS at 07:55:00

## 2023-02-09 RX ADMIN — SODIUM CHLORIDE, SODIUM LACTATE, POTASSIUM CHLORIDE, CALCIUM CHLORIDE: 600; 310; 30; 20 INJECTION, SOLUTION INTRAVENOUS at 07:35:00

## 2023-02-09 RX ADMIN — EPHEDRINE SULFATE 10 MG: 50 INJECTION, SOLUTION INTRAVENOUS at 08:06:00

## 2023-02-09 NOTE — INTERVAL H&P NOTE
Pre-op Diagnosis: Incarcerated right inguinal hernia    The above referenced H&P was reviewed by Driss Horne MD on 2/9/2023, the patient was examined and no significant changes have occurred in the patient's condition since the H&P was performed. I discussed with the patient and/or legal representative the potential benefits, risks and side effects of this procedure; the likelihood of the patient achieving goals; and potential problems that might occur during recuperation. I discussed reasonable alternatives to the procedure, including risks, benefits and side effects related to the alternatives and risks related to not receiving this procedure. We will proceed with procedure as planned.

## 2023-02-09 NOTE — BRIEF OP NOTE
Pre-Operative Diagnosis: Incarcerated right inguinal hernia     Post-Operative Diagnosis: Incarcerated right inguinal hernia      Procedure Performed:   Robotic repair of incarcerated right inguinal hernia with mesh    Surgeon(s) and Role:     Ely Ivey MD - Primary    Assistant(s):  PA: NATALI Ingram     Surgical Findings: Incarcerated right inguinal hernia     Specimen: None     Estimated Blood Loss: Blood Output: 4 mL (2/9/2023  9:33 AM)      Dictation Number:      Maritza Hernandez MD  2/9/2023  9:33 AM

## 2023-02-09 NOTE — ANESTHESIA PROCEDURE NOTES
Peripheral IV  Date/Time: 2/9/2023 7:46 AM  Inserted by: Pastora Allison CRNA    Placement  Needle size: 18 G  Laterality: right  Location: forearm  Local anesthetic: none  Site prep: alcohol  Technique: anatomical landmarks  Attempts: 1

## 2023-02-09 NOTE — CONSULTS
Alexia Miller is a [de-identified]year old female. Patient presents with:  New Patient: Consult hernia    HPI:   The patient complains of right inguinal hernia. Signs and symptoms of hernia were first noticed 7 years. Recently increasing partial incarceration   Aggravating factors include none. Associated symptoms include bulge. Patient with crest syndrome    Patient is on eliquis for a [facility] cardiologist Dr. Pete Morales  Prior imaging was ct scan of the abd and pelvis:  IMPRESSION:     1. There are multiple loops of dilated small bowel measuring up to 5.3 cm. No focal transition point is identified, and the small bowel gradually tapers to nondilated loops in the right lower quadrant. In addition, pelvic ileal loops are underdistended but demonstrate some mild wall thickening. Findings may relate to a mild enteritis and/or the patient's known scleroderma. Follow-up could be obtained with a modified small bowel follow through if there is persistent clinical concern for bowel obstruction. 2. 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 Operative Report - Herbert Lopez MD - 04/07/2022 6:13 PM CDT  Formatting of this note might be different from the original.  PATIENT NAME: Melissa Riggins  MRN: [id number]  DATE OF OPERATION: 4/7/2022  PREOPERATIVE DIAGNOSIS:   Diverticulosis, history of bowel obstruction, scleroderma, CT scan showed focal thickening of the mid sigmoid. The patient underwent a colonoscopy by myself in July 2020 without obvious mass lesion. POSTOPERATIVE DIAGNOSES:  1. Poor colon prep.   2. Diverticulosis  PROCEDURE PERFORMED: Flexible sigmoidoscopy  SURGEON: Dr. Yue Zhang: None  Anesthesia; none  Specimen: None  QUALITY OF PREP: Poor  CONSENT: Informed and obtained from the patient  COMPLICATIONS: None immediately apparent    Kaye Prabhakar  Physician  Surgical Oncology  Operative Report   Addendum  Date of Service: 9/14/2021 1:56 PM                              Date of operation 9/14/2021    Preoperative diagnosis:  1. Chronic small bowel obstruction with recurrent symptoms    Operation performed:  1. Diagnostic laparoscopy  2. Reduction of internal hernia  3. Biopsy of mesenteric nodule    Postoperative diagnosis:  1. Internal hernia likely secondary to adhesive band    Operating surgeon:  1. Kalani Gonzalez MD    Assistant:  1. NATALI Pressley       Allergies:  No Known Allergies   Current Meds:  Current Outpatient Medications   Medication Sig Dispense Refill   pantoprazole 40 MG Oral Tab EC Take 1 tablet (40 mg total) by mouth 2 (two) times daily before meals. 30 minutes before breakfast and dinner. 60 tablet 5   apixaban 5 MG Oral Tab Take 1 tablet (5 mg total) by mouth 2 (two) times daily. 0   famotidine 20 MG Oral Tab Take 20 mg by mouth daily as needed for Heartburn. torsemide 20 MG Oral Tab Take 1 tablet (20 mg total) by mouth 2 (two) times daily. 60 tablet 0   acetaminophen 500 MG Oral Tab Take 1,000 mg by mouth every 6 (six) hours as needed. ergocalciferol 1.25 MG (31740 UT) Oral Cap Take 50,000 Units by mouth once a week. Pt. Completed therapy   dilTIAZem HCl ER Coated Beads 120 MG Oral Capsule SR 24 Hr Take 120 mg by mouth daily. Levothyroxine Sodium 125 MCG Oral Tab Take 125 mcg by mouth before breakfast.   torsemide 20 MG Oral Tab Take 20 mg by mouth daily. Potassium Chloride ER 20 MEQ Oral Tab CR Take 20 mEq by mouth as needed. Takes with diuretic; as needed   amiodarone HCl 200 MG Oral Tab Take 200 mg by mouth daily. CYANOCOBALAMIN 1000 MCG/ML Injection Solution ADMINISTER 1 ML(1000 MCG) IN THE MUSCLE EVERY 30 DAYS 3 mL 0   traMADol 50 MG Oral Tab Take 1 tablet (50 mg total) by mouth every 6 (six) hours as needed for Pain.  (Patient not taking: Reported on 1/18/2023) 20 tablet 0   PEG 3350-KCl-Na Bicarb-NaCl (TRILYTE) 420 g Oral Recon Soln Take as directed, split dose 1 Bottle 0       HISTORY:  Past Medical History:   Diagnosis Date   Arrhythmia   afib   Back problem   Disorder of thyroid   Esophageal reflux   High blood pressure   High cholesterol   Neuropathy   Problems with swallowing   Visual impairment   glasses     Past Surgical History:   Procedure Laterality Date   CHOLECYSTECTOMY   COLECTOMY 09/14/2021   Diagnostic laparoscopy, possible laraotomy, small bowel resection   COLONOSCOPY N/A 07/29/2020   Procedure: COLONOSCOPY; Surgeon: Eve Olivares MD; Location: [address] [place] [address] ENDOSCOPY   HERNIA SURGERY   open momo scar hernia with mesh     Family History   Problem Relation Age of Onset   Pancreatic Cancer Mother     Social History  Tobacco Use  Smoking status: Never  Smokeless tobacco: Never  Vaping Use  Vaping Use: Never used  Alcohol use: No  Drug use: No      ROS:   HEENT: denies nasal congestion, sinus pain or sore throat  RESPIRATORY: denies shortness of breath, wheezing or cough   CARDIOVASCULAR: denies chest pain or GRANADOS; no palpitations   GI: denies nausea, vomiting, constipation, diarrhea; no rectal bleeding  GENITAL/: dysuria - none; nocturia - 1 time  MUSCULOSKELETAL: no joint complaints upper or lower extremities  NEURO: no sensory or motor complaint  PSYCHE: no symptoms of depression or anxiety  HEMATOLOGY: no bruising or excessive bleeding; anticoagulants: eliquis   ENDOCRINE: denies excessive thirst or urination; denies unexpected wt gain or wt loss    PHYSICAL EXAM:   GENERAL: well developed, well nourished, in no apparent distress  SKIN: no rashes, no suspicious lesions  HEENT: PERRLA, EOMI, anicteric, conjunctiva normal; no JVD, no TMJ   RESPIRATORY: clear to percussion and auscultation  CARDIOVASCULAR: normal, RRR; good peripheral perfusion  ABDOMEN: soft, nontender; no HSM; no masses; large incarcerated high right inguinal hernia present; no evidence of left inguinal hernia present.    GENITAL/: normal external genitalia  LYMPHATIC: no lymphadenopathy  EXTREMITIES: no cyanosis, clubbing or edema, peripheral pulses intact  NEUROLOGIC: intact; no sensorimotor deficit; reflexes normal    ASSESSMENT/ PLAN:   This is a [de-identified]year old female who has an incarcerated right inguinal hernia. Is a incarcerated right inguinal hernia appears to be high within inguinal canal.  I reviewed CT imaging which reveals what appears to be an inguinal hernia however I am concerned that this may be a low-lying ventral hernia. This will be determined at the time of the operation. Patient is on Eliquis. Patient's son is a cardiologist to [employer]. Will obtain cardiac clearance. Patient will need to hold Eliquis 3 days prior to surgery. I did discuss concerns for constipation with the patient. Would recommend MiraLAX to be taken 1 capful daily. This will avoid straining and ongoing constipation. I reviewed the old op note from the laparoscopic lysis of adhesions and did talk with Dr. Hannah Tran who feels that the pelvis is free of adhesions. Patient did have a ventral hernia repair with mesh of the right upper quadrant open cholecystectomy scar. I had a lengthy discussion with the patient as to the etiology of inguinal hernias, as well as treatment options. I discussed the risk of nonoperative management including the low risk of incarceration statistically. I review the education booklet with the patient on hernias. I discussed with the patient open laparoscopic versus robotic-assisted inguinal hernia repair in detail.  I discussed the risks of the procedure including but not limited to bleeding, infection, wound infection, infected mesh, recurrent hernia even if mesh is used, post operative hematoma, perforated hollow viscus, vascular injury, need to convert to an open procedure, paraesthesia or chronic pain numbness or tingling involving the inner groin, scrotal, or thigh region, post operative activities and limitations, if an occult hernia is found on the contralateral side this will be repaired at the time of the initial operation; as well as the risks of anesthesia including myocardial infarction, respiratory failure, renal failure, stroke, pulmonary embolism, deep vein thrombosis, and even death were discussed in detail with the patient who understands, consents, and wishes to proceed with the operation . I reviewed patient education material with the patient. Will plan robotic-assisted repair of incarcerated right inguinal hernia with mesh possible bilateral inguinal hernia repair with mesh possible open under general anesthesia at [facility] on Thursday, February 9, 2023. Patient will hold Eliquis last dose will be Monday morning prior to surgery. .   Total time spent in direct patient contact and decision-making And coordinating the patient's care including greater than 50% face-to-face was 45 minutes.     Chivo Rinaldi cardologist, Dr. Teodoro Woodson

## 2023-02-09 NOTE — ANESTHESIA PROCEDURE NOTES
Airway  Date/Time: 2/9/2023 7:44 AM  Urgency: Elective      General Information and Staff    Patient location during procedure: OR  Anesthesiologist: Zoya Watkins MD  Resident/CRNA: Rob Iniguez CRNA  Performed: CRNA     Indications and Patient Condition  Indications for airway management: anesthesia  Sedation level: deep  Preoxygenated: yes  Patient position: sniffing  Mask difficulty assessment: 0 - not attempted    Final Airway Details  Final airway type: endotracheal airway      Successful airway: ETT  Cuffed: yes   Successful intubation technique: Video laryngoscopy  Facilitating devices/methods: rapid sequence intubation and cricoid pressure  Endotracheal tube insertion site: oral  Blade: GlideScope  Blade size: #3  ETT size (mm): 7.0    Placement verified by: chest auscultation and capnometry   Cuff volume (mL): 6  Measured from: teeth  ETT to teeth (cm): 21  Number of attempts at approach: 1    Additional Comments  Atraumatic.  Gauze bite block placed

## 2023-02-09 NOTE — OPERATIVE REPORT
[facility] OPERATING ROOM OPERATIVE REPORT:     PATIENT NAME: Tiffany Snyder  : 1940   MRN: [id number]  [facility]   [facility]    PREOPERATIVE DIAGNOSIS: Right chronically incarcerated  inguinal hernia     POSTOPERATIVE DIAGNOSIS: Right chronically incarcerated   inguinal hernia     PROCEDURE PERFORMED:   robotic  repair of   chronically incarcerated  Right inguinal hernia repair with mesh,           SURGEON:  Janki Guillory MD    ASST:  Shaylee HURST (Assistant helped position patient and helped with positioning, retraction, suturing, closure, dressings etc.)    ANESTHESIA: General anesthesia with endotracheal tube    ESTIMATED BLOOD LOSS:   4 ml    COMPLICATIONS: none    INDICATIONS:   This is a [de-identified]year old female who presents with a large, chronically incarcerated, right inguinal hernia, which is symptomatic. I had a lengthy discussion with the patient as to the etiology of the above. I discussed options of continued observation versus surgical repair. Open versus laparoscopic versus robotic repair were discussed in extensive detail. The risks of the procedure including bleeding, infection, postoperative hematoma, wound infection, nonhealing wound, scar formation, recurrent hernia if mesh is used, infected mesh, chronic pain, numbness, or tingling of the inner groin, scrotal or thigh region, perforated hollow viscus, vascular injury, need to convert to an open procedure, need for possible bilateral repair if hernia is present on the contralateral side, as well as risks with anesthesia including myocardial infarction, respiratory failure, renal failure, pulmonary embolus, DVT and even death were all discussed in detail with the patient who understands, consents, and wishes to proceed with the operation. OPERATION:   The patient was brought to the operating room and placed on the operating table in the supine position.  General anesthetic was administered by  endotracheal tube  by anesthesia. The patient's stomach was decompressed with an orogastric tube. The bladder was compressed with a Pollock catheter. The abdomen was prepped and draped in routine sterile fashion. A small incision was made in the supraumbilical region. A Veress needle was introduced into the abdominal cavity without difficulty. Using the saline drop test, placement was confirmed and pneumoperitoneum was established to approximately 15 mmHg. Next, an 8 mm robotic trocar  was inserted under direct visualization into the abdominal cavity without difficulty. A laparoscopic camera was inserted and exploration of all 4 quadrants revealed no evidence of bowel injury or vascular injury present. Next, two 8 mm robotic trocars were placed, 1 in the right and left flank, under direct visualization without difficulty. Visualization of the  inguinal region revealed a Right incarcerated inguinal hernia present. There was no evidence of a  Left    inguinal hernia present on examination intraperitoneally. At this time, it was decided that a   robotic  Right  inguinal hernia repair would be performed. The patient was placed in position. The instruments were inserted. I began  to perform the  robotic Right  herniorrhaphy. Using scissors, the peritoneum was scored on the anterior abdominal wall in the Right  inguinal region. This extended medially laterally. The preperitoneal space was dissected free using blunt dissection. The epigastric vessels were identified and preserved. The hernia sac was identified and dissected free circumferentially. A right  large incarcerated direct   hernia defect was identified. The  hernia defect was identified and this was dissected free into the preperitoneal space. The pubic symphysis and Matthieu ligaments were dissected free medially. Laterally, the dissection continued out into the preperitoneal space into the pelvis into the femoral vessels. Care was taken to avoid injury to these vascular structures. The role ligament was identified this was divided electrocautery. At this time, a piece of ProGrip was placed with  the   medial portion against the right pubic symphysis and the lateral portion into the opening. The mesh was unrolled and placed overlying the defect. The mesh extended into the retroperitoneum and easily covered the retroperitoneum and femoral vessels to prevent a hernia from recurring. Anteriorly, the mesh was opened up into the anterior abdominal wall. The mesh was in good position, covering the defect adequately overlying the femoral/iliac vessels. At this time, pneumoperitoneum was reduced to approximately 8 mmHg. The peritoneum was reapproximated with 2-0 V-Loc 90-day running suture. At this time, bilateral TAP block was performed after the instruments were removed. The 8 mm robotic camera trochar was  removed  with no impingement of bowel or bleeding present. The remaining pneumoperitoneum was removed and the remaining 8 mm trocars were removed under direct visualization with no impingement of bowel or bleeding present. The wounds was irrigated thoroughly with saline solution. The skin was approximated with 4-0 Vicryl in subcuticular suture. Mastisol and Steri-Strips were applied to the wound. Sterile dressing was applied to the wound. The patient was transferred off the operative table, to the recovery room, extubated, in stable condition. All counts were correct at the end of the case.         [address]    2/9/2023  9:35 AM  Robyn Houston

## 2023-02-09 NOTE — H&P (VIEW-ONLY)
Dulce Carmona is a [de-identified]year old female. Patient presents with:  New Patient: Consult hernia    HPI:   The patient complains of right inguinal hernia. Signs and symptoms of hernia were first noticed 7 years. Recently increasing partial incarceration   Aggravating factors include none. Associated symptoms include bulge. Patient with crest syndrome    Patient is on eliquis for a [facility] cardiologist Dr. Mariano Walters  Prior imaging was ct scan of the abd and pelvis:  IMPRESSION:     1. There are multiple loops of dilated small bowel measuring up to 5.3 cm. No focal transition point is identified, and the small bowel gradually tapers to nondilated loops in the right lower quadrant. In addition, pelvic ileal loops are underdistended but demonstrate some mild wall thickening. Findings may relate to a mild enteritis and/or the patient's known scleroderma. Follow-up could be obtained with a modified small bowel follow through if there is persistent clinical concern for bowel obstruction. 2. 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 Operative Report - Nakia Santiago MD - 04/07/2022 6:13 PM CDT  Formatting of this note might be different from the original.  PATIENT NAME: Álvaro Ospina  MRN: [id number]  DATE OF OPERATION: 4/7/2022  PREOPERATIVE DIAGNOSIS:   Diverticulosis, history of bowel obstruction, scleroderma, CT scan showed focal thickening of the mid sigmoid. The patient underwent a colonoscopy by myself in July 2020 without obvious mass lesion. POSTOPERATIVE DIAGNOSES:  1. Poor colon prep.   2. Diverticulosis  PROCEDURE PERFORMED: Flexible sigmoidoscopy  SURGEON: Dr. Clinton Dress: None  Anesthesia; none  Specimen: None  QUALITY OF PREP: Poor  CONSENT: Informed and obtained from the patient  COMPLICATIONS: None immediately apparent    Milagros Johns  Physician  Surgical Oncology  Operative Report   Addendum  Date of Service: 9/14/2021 1:56 PM                              Date of operation 9/14/2021    Preoperative diagnosis:  1. Chronic small bowel obstruction with recurrent symptoms    Operation performed:  1. Diagnostic laparoscopy  2. Reduction of internal hernia  3. Biopsy of mesenteric nodule    Postoperative diagnosis:  1. Internal hernia likely secondary to adhesive band    Operating surgeon:  1. Hannah Tran MD    Assistant:  1. NATALI Landon       Allergies:  No Known Allergies   Current Meds:  Current Outpatient Medications   Medication Sig Dispense Refill   pantoprazole 40 MG Oral Tab EC Take 1 tablet (40 mg total) by mouth 2 (two) times daily before meals. 30 minutes before breakfast and dinner. 60 tablet 5   apixaban 5 MG Oral Tab Take 1 tablet (5 mg total) by mouth 2 (two) times daily. 0   famotidine 20 MG Oral Tab Take 20 mg by mouth daily as needed for Heartburn. torsemide 20 MG Oral Tab Take 1 tablet (20 mg total) by mouth 2 (two) times daily. 60 tablet 0   acetaminophen 500 MG Oral Tab Take 1,000 mg by mouth every 6 (six) hours as needed. ergocalciferol 1.25 MG (26510 UT) Oral Cap Take 50,000 Units by mouth once a week. Pt. Completed therapy   dilTIAZem HCl ER Coated Beads 120 MG Oral Capsule SR 24 Hr Take 120 mg by mouth daily. Levothyroxine Sodium 125 MCG Oral Tab Take 125 mcg by mouth before breakfast.   torsemide 20 MG Oral Tab Take 20 mg by mouth daily. Potassium Chloride ER 20 MEQ Oral Tab CR Take 20 mEq by mouth as needed. Takes with diuretic; as needed   amiodarone HCl 200 MG Oral Tab Take 200 mg by mouth daily. CYANOCOBALAMIN 1000 MCG/ML Injection Solution ADMINISTER 1 ML(1000 MCG) IN THE MUSCLE EVERY 30 DAYS 3 mL 0   traMADol 50 MG Oral Tab Take 1 tablet (50 mg total) by mouth every 6 (six) hours as needed for Pain.  (Patient not taking: Reported on 1/18/2023) 20 tablet 0   PEG 3350-KCl-Na Bicarb-NaCl (TRILYTE) 420 g Oral Recon Soln Take as directed, split dose 1 Bottle 0       HISTORY:  Past Medical History:   Diagnosis Date   Arrhythmia   afib   Back problem   Disorder of thyroid   Esophageal reflux   High blood pressure   High cholesterol   Neuropathy   Problems with swallowing   Visual impairment   glasses     Past Surgical History:   Procedure Laterality Date   CHOLECYSTECTOMY   COLECTOMY 09/14/2021   Diagnostic laparoscopy, possible laraotomy, small bowel resection   COLONOSCOPY N/A 07/29/2020   Procedure: COLONOSCOPY; Surgeon: Tiffani Valadez MD; Location: [facility] ENDOSCOPY   HERNIA SURGERY   open momo scar hernia with mesh     Family History   Problem Relation Age of Onset   Pancreatic Cancer Mother     Social History  Tobacco Use  Smoking status: Never  Smokeless tobacco: Never  Vaping Use  Vaping Use: Never used  Alcohol use: No  Drug use: No      ROS:   HEENT: denies nasal congestion, sinus pain or sore throat  RESPIRATORY: denies shortness of breath, wheezing or cough   CARDIOVASCULAR: denies chest pain or GRANADOS; no palpitations   GI: denies nausea, vomiting, constipation, diarrhea; no rectal bleeding  GENITAL/: dysuria - none; nocturia - 1 time  MUSCULOSKELETAL: no joint complaints upper or lower extremities  NEURO: no sensory or motor complaint  PSYCHE: no symptoms of depression or anxiety  HEMATOLOGY: no bruising or excessive bleeding; anticoagulants: eliquis   ENDOCRINE: denies excessive thirst or urination; denies unexpected wt gain or wt loss    PHYSICAL EXAM:   GENERAL: well developed, well nourished, in no apparent distress  SKIN: no rashes, no suspicious lesions  HEENT: PERRLA, EOMI, anicteric, conjunctiva normal; no JVD, no TMJ   RESPIRATORY: clear to percussion and auscultation  CARDIOVASCULAR: normal, RRR; good peripheral perfusion  ABDOMEN: soft, nontender; no HSM; no masses; large incarcerated high right inguinal hernia present; no evidence of left inguinal hernia present.    GENITAL/: normal external genitalia  LYMPHATIC: no lymphadenopathy  EXTREMITIES: no cyanosis, clubbing or edema, peripheral pulses intact  NEUROLOGIC: intact; no sensorimotor deficit; reflexes normal    ASSESSMENT/ PLAN:   This is a [de-identified]year old female who has an incarcerated right inguinal hernia. Is a incarcerated right inguinal hernia appears to be high within inguinal canal.  I reviewed CT imaging which reveals what appears to be an inguinal hernia however I am concerned that this may be a low-lying ventral hernia. This will be determined at the time of the operation. Patient is on Eliquis. Patient's son is a cardiologist to [employer]. Will obtain cardiac clearance. Patient will need to hold Eliquis 3 days prior to surgery. I did discuss concerns for constipation with the patient. Would recommend MiraLAX to be taken 1 capful daily. This will avoid straining and ongoing constipation. I reviewed the old op note from the laparoscopic lysis of adhesions and did talk with Dr. Kalani Gonzalez who feels that the pelvis is free of adhesions. Patient did have a ventral hernia repair with mesh of the right upper quadrant open cholecystectomy scar. I had a lengthy discussion with the patient as to the etiology of inguinal hernias, as well as treatment options. I discussed the risk of nonoperative management including the low risk of incarceration statistically. I review the education booklet with the patient on hernias. I discussed with the patient open laparoscopic versus robotic-assisted inguinal hernia repair in detail.  I discussed the risks of the procedure including but not limited to bleeding, infection, wound infection, infected mesh, recurrent hernia even if mesh is used, post operative hematoma, perforated hollow viscus, vascular injury, need to convert to an open procedure, paraesthesia or chronic pain numbness or tingling involving the inner groin, scrotal, or thigh region, post operative activities and limitations, if an occult hernia is found on the contralateral side this will be repaired at the time of the initial operation; as well as the risks of anesthesia including myocardial infarction, respiratory failure, renal failure, stroke, pulmonary embolism, deep vein thrombosis, and even death were discussed in detail with the patient who understands, consents, and wishes to proceed with the operation . I reviewed patient education material with the patient. Will plan robotic-assisted repair of incarcerated right inguinal hernia with mesh possible bilateral inguinal hernia repair with mesh possible open under general anesthesia at [facility] on Thursday, February 9, 2023. Patient will hold Eliquis last dose will be Monday morning prior to surgery. .   Total time spent in direct patient contact and decision-making And coordinating the patient's care including greater than 50% face-to-face was 45 minutes.     Tyshawn Strickland cardologist, Dr. Jessica Johnson

## 2023-03-24 DIAGNOSIS — D51.3 OTHER DIETARY VITAMIN B12 DEFICIENCY ANEMIA: ICD-10-CM

## 2023-03-24 RX ORDER — CYANOCOBALAMIN 1000 UG/ML
1000 INJECTION, SOLUTION INTRAMUSCULAR; SUBCUTANEOUS
Qty: 3 ML | Refills: 3 | Status: SHIPPED | OUTPATIENT
Start: 2023-03-24

## 2023-11-05 ENCOUNTER — IMMUNIZATION (OUTPATIENT)
Dept: LAB | Age: 83
End: 2023-11-05
Attending: EMERGENCY MEDICINE
Payer: MEDICARE

## 2023-11-05 DIAGNOSIS — Z23 NEED FOR VACCINATION: Primary | ICD-10-CM

## 2023-11-05 PROCEDURE — 90480 ADMN SARSCOV2 VAC 1/ONLY CMP: CPT

## 2023-12-08 DIAGNOSIS — D51.3 OTHER DIETARY VITAMIN B12 DEFICIENCY ANEMIA: ICD-10-CM

## 2023-12-08 RX ORDER — CYANOCOBALAMIN 1000 UG/ML
1000 INJECTION, SOLUTION INTRAMUSCULAR; SUBCUTANEOUS
Qty: 3 ML | Refills: 3 | Status: SHIPPED | OUTPATIENT
Start: 2023-12-08

## 2024-03-07 DIAGNOSIS — D51.3 OTHER DIETARY VITAMIN B12 DEFICIENCY ANEMIA: Primary | ICD-10-CM

## 2024-03-07 RX ORDER — CYANOCOBALAMIN 1000 UG/ML
1000 INJECTION, SOLUTION INTRAMUSCULAR; SUBCUTANEOUS
Qty: 3 ML | Refills: 3 | Status: SHIPPED | OUTPATIENT
Start: 2024-03-07

## 2024-11-19 ENCOUNTER — HOSPITAL ENCOUNTER (INPATIENT)
Facility: HOSPITAL | Age: 84
LOS: 4 days | Discharge: HOME | End: 2024-11-23
Attending: EMERGENCY MEDICINE | Admitting: HOSPITALIST
Payer: MEDICARE

## 2024-11-19 ENCOUNTER — APPOINTMENT (OUTPATIENT)
Dept: GENERAL RADIOLOGY | Facility: HOSPITAL | Age: 84
DRG: 389 | End: 2024-11-19
Attending: EMERGENCY MEDICINE
Payer: MEDICARE

## 2024-11-19 ENCOUNTER — APPOINTMENT (OUTPATIENT)
Dept: GENERAL RADIOLOGY | Facility: HOSPITAL | Age: 84
End: 2024-11-19
Attending: EMERGENCY MEDICINE
Payer: MEDICARE

## 2024-11-19 ENCOUNTER — APPOINTMENT (OUTPATIENT)
Dept: PICC SERVICES | Facility: HOSPITAL | Age: 84
DRG: 389 | End: 2024-11-19
Attending: HOSPITALIST
Payer: MEDICARE

## 2024-11-19 ENCOUNTER — HOSPITAL ENCOUNTER (INPATIENT)
Facility: HOSPITAL | Age: 84
LOS: 4 days | Discharge: HOME | DRG: 389 | End: 2024-11-23
Attending: EMERGENCY MEDICINE | Admitting: HOSPITALIST
Payer: MEDICARE

## 2024-11-19 ENCOUNTER — APPOINTMENT (OUTPATIENT)
Dept: CT IMAGING | Facility: HOSPITAL | Age: 84
DRG: 389 | End: 2024-11-19
Attending: EMERGENCY MEDICINE
Payer: MEDICARE

## 2024-11-19 ENCOUNTER — APPOINTMENT (OUTPATIENT)
Dept: PICC SERVICES | Facility: HOSPITAL | Age: 84
End: 2024-11-19
Attending: HOSPITALIST
Payer: MEDICARE

## 2024-11-19 ENCOUNTER — APPOINTMENT (OUTPATIENT)
Dept: CT IMAGING | Facility: HOSPITAL | Age: 84
End: 2024-11-19
Attending: EMERGENCY MEDICINE
Payer: MEDICARE

## 2024-11-19 DIAGNOSIS — K56.609 SMALL BOWEL OBSTRUCTION (HCC): Primary | ICD-10-CM

## 2024-11-19 PROBLEM — D64.9 ANEMIA: Status: ACTIVE | Noted: 2024-11-19

## 2024-11-19 LAB
ALBUMIN SERPL-MCNC: 4.2 G/DL (ref 3.2–4.8)
ALP LIVER SERPL-CCNC: 81 U/L
ALT SERPL-CCNC: <7 U/L
ANION GAP SERPL CALC-SCNC: 6 MMOL/L (ref 0–18)
AST SERPL-CCNC: <8 U/L (ref ?–34)
ATRIAL RATE: 64 BPM
BASOPHILS # BLD AUTO: 0.04 X10(3) UL (ref 0–0.2)
BASOPHILS NFR BLD AUTO: 0.7 %
BILIRUB DIRECT SERPL-MCNC: 0.3 MG/DL (ref ?–0.3)
BILIRUB SERPL-MCNC: 0.7 MG/DL (ref 0.2–1.1)
BILIRUB UR QL: NEGATIVE
BUN BLD-MCNC: 17 MG/DL (ref 9–23)
BUN/CREAT SERPL: 14.8 (ref 10–20)
CALCIUM BLD-MCNC: 10.1 MG/DL (ref 8.7–10.4)
CHLORIDE SERPL-SCNC: 102 MMOL/L (ref 98–112)
CLARITY UR: CLEAR
CO2 SERPL-SCNC: 30 MMOL/L (ref 21–32)
CREAT BLD-MCNC: 1.15 MG/DL
DEPRECATED RDW RBC AUTO: 46.3 FL (ref 35.1–46.3)
EGFRCR SERPLBLD CKD-EPI 2021: 48 ML/MIN/1.73M2 (ref 60–?)
EOSINOPHIL # BLD AUTO: 0.04 X10(3) UL (ref 0–0.7)
EOSINOPHIL NFR BLD AUTO: 0.7 %
ERYTHROCYTE [DISTWIDTH] IN BLOOD BY AUTOMATED COUNT: 13.6 % (ref 11–15)
GLUCOSE BLD-MCNC: 94 MG/DL (ref 70–99)
GLUCOSE UR-MCNC: NORMAL MG/DL
HCT VFR BLD AUTO: 33.4 %
HGB BLD-MCNC: 11.3 G/DL
HGB UR QL STRIP.AUTO: NEGATIVE
IMM GRANULOCYTES # BLD AUTO: 0.02 X10(3) UL (ref 0–1)
IMM GRANULOCYTES NFR BLD: 0.3 %
LEUKOCYTE ESTERASE UR QL STRIP.AUTO: NEGATIVE
LIPASE SERPL-CCNC: 54 U/L (ref 12–53)
LYMPHOCYTES # BLD AUTO: 0.7 X10(3) UL (ref 1–4)
LYMPHOCYTES NFR BLD AUTO: 11.9 %
MCH RBC QN AUTO: 31.8 PG (ref 26–34)
MCHC RBC AUTO-ENTMCNC: 33.8 G/DL (ref 31–37)
MCV RBC AUTO: 94.1 FL
MONOCYTES # BLD AUTO: 0.38 X10(3) UL (ref 0.1–1)
MONOCYTES NFR BLD AUTO: 6.5 %
NEUTROPHILS # BLD AUTO: 4.68 X10 (3) UL (ref 1.5–7.7)
NEUTROPHILS # BLD AUTO: 4.68 X10(3) UL (ref 1.5–7.7)
NEUTROPHILS NFR BLD AUTO: 79.9 %
NITRITE UR QL STRIP.AUTO: NEGATIVE
OSMOLALITY SERPL CALC.SUM OF ELEC: 287 MOSM/KG (ref 275–295)
P AXIS: 61 DEGREES
P-R INTERVAL: 148 MS
PH UR: 7.5 [PH] (ref 5–8)
PLATELET # BLD AUTO: 194 10(3)UL (ref 150–450)
POTASSIUM SERPL-SCNC: 4.9 MMOL/L (ref 3.5–5.1)
PROT SERPL-MCNC: 7.8 G/DL (ref 5.7–8.2)
Q-T INTERVAL: 460 MS
QRS DURATION: 96 MS
QTC CALCULATION (BEZET): 474 MS
R AXIS: 53 DEGREES
RBC # BLD AUTO: 3.55 X10(6)UL
SODIUM SERPL-SCNC: 138 MMOL/L (ref 136–145)
SP GR UR STRIP: 1.03 (ref 1–1.03)
T AXIS: 38 DEGREES
TROPONIN I SERPL HS-MCNC: 10 NG/L
UROBILINOGEN UR STRIP-ACNC: NORMAL
VENTRICULAR RATE: 64 BPM
WBC # BLD AUTO: 5.9 X10(3) UL (ref 4–11)

## 2024-11-19 PROCEDURE — 74176 CT ABD & PELVIS W/O CONTRAST: CPT | Performed by: EMERGENCY MEDICINE

## 2024-11-19 PROCEDURE — 99223 1ST HOSP IP/OBS HIGH 75: CPT | Performed by: STUDENT IN AN ORGANIZED HEALTH CARE EDUCATION/TRAINING PROGRAM

## 2024-11-19 PROCEDURE — 99222 1ST HOSP IP/OBS MODERATE 55: CPT | Performed by: HOSPITALIST

## 2024-11-19 PROCEDURE — 71045 X-RAY EXAM CHEST 1 VIEW: CPT | Performed by: EMERGENCY MEDICINE

## 2024-11-19 RX ORDER — VALSARTAN 80 MG/1
80 TABLET ORAL 2 TIMES DAILY
COMMUNITY

## 2024-11-19 RX ORDER — ONDANSETRON 2 MG/ML
4 INJECTION INTRAMUSCULAR; INTRAVENOUS ONCE
Status: COMPLETED | OUTPATIENT
Start: 2024-11-19 | End: 2024-11-19

## 2024-11-19 RX ORDER — MORPHINE SULFATE 2 MG/ML
1 INJECTION, SOLUTION INTRAMUSCULAR; INTRAVENOUS EVERY 2 HOUR PRN
Status: DISCONTINUED | OUTPATIENT
Start: 2024-11-19 | End: 2024-11-23

## 2024-11-19 RX ORDER — ZOLPIDEM TARTRATE 12.5 MG/1
12.5 TABLET, FILM COATED, EXTENDED RELEASE ORAL NIGHTLY PRN
COMMUNITY

## 2024-11-19 RX ORDER — DEXTROSE MONOHYDRATE, SODIUM CHLORIDE, AND POTASSIUM CHLORIDE 50; 1.49; 4.5 G/1000ML; G/1000ML; G/1000ML
INJECTION, SOLUTION INTRAVENOUS CONTINUOUS
Status: CANCELLED | OUTPATIENT
Start: 2024-11-19

## 2024-11-19 RX ORDER — ONDANSETRON 2 MG/ML
4 INJECTION INTRAMUSCULAR; INTRAVENOUS EVERY 6 HOURS PRN
Status: DISCONTINUED | OUTPATIENT
Start: 2024-11-19 | End: 2024-11-23

## 2024-11-19 RX ORDER — METOCLOPRAMIDE HYDROCHLORIDE 5 MG/ML
10 INJECTION INTRAMUSCULAR; INTRAVENOUS ONCE
Status: COMPLETED | OUTPATIENT
Start: 2024-11-19 | End: 2024-11-19

## 2024-11-19 RX ORDER — MORPHINE SULFATE 2 MG/ML
2 INJECTION, SOLUTION INTRAMUSCULAR; INTRAVENOUS EVERY 2 HOUR PRN
Status: DISCONTINUED | OUTPATIENT
Start: 2024-11-19 | End: 2024-11-23

## 2024-11-19 RX ORDER — DIPHENHYDRAMINE HYDROCHLORIDE 50 MG/ML
25 INJECTION INTRAMUSCULAR; INTRAVENOUS ONCE
Status: COMPLETED | OUTPATIENT
Start: 2024-11-19 | End: 2024-11-19

## 2024-11-19 RX ORDER — MORPHINE SULFATE 4 MG/ML
4 INJECTION, SOLUTION INTRAMUSCULAR; INTRAVENOUS EVERY 2 HOUR PRN
Status: DISCONTINUED | OUTPATIENT
Start: 2024-11-19 | End: 2024-11-23

## 2024-11-19 RX ORDER — DEXTROSE MONOHYDRATE AND SODIUM CHLORIDE 5; .45 G/100ML; G/100ML
INJECTION, SOLUTION INTRAVENOUS CONTINUOUS
Status: DISCONTINUED | OUTPATIENT
Start: 2024-11-19 | End: 2024-11-22

## 2024-11-19 NOTE — H&P
[facility]    PATIENT'S NAME: LOLA EVANS   ATTENDING PHYSICIAN: Daniel Bradley MD   PATIENT ACCOUNT#:   [id number]    LOCATION:  [address]  MEDICAL RECORD #:   [id number]       YOB: 1940  ADMISSION DATE:       11/19/2024    HISTORY AND PHYSICAL EXAMINATION    CHIEF COMPLAINT:  Partial small bowel obstruction.    HISTORY OF PRESENT ILLNESS:  Patient is an 84-year-old  female who came into the emergency department for evaluation of intense, crampy abdominal pain since yesterday.  She was seen at [facility] last night, and because she was complaining of chest discomfort, she had CT angiogram of the chest and pelvis which showed no PE but dilated small bowel.  Today, family brought her in to the emergency room at [place].  CBC and chemistry were unremarkable except for GFR of 48, which is slightly below her baseline; potassium 4.9.  Lipase minimally elevated.  CT scan of the abdomen and pelvis showed partial small bowel obstruction versus small bowel ileus, no discrete transition point; extensive fecalization of material within the distal small bowel consistent with delayed transit or stasis; no pneumatosis or pneumoperitoneum.    PAST MEDICAL HISTORY:  Hypertension; hyperlipidemia; paroxysmal atrial fibrillation, maintained in sinus rhythm on amiodarone; gastroesophageal reflux disease; hypothyroidism; osteoarthritis; degenerative joint disease of lumbar spine; peripheral neuropathy; CREST variant scleroderma; chronic kidney disease stage 2 to 3.    PAST SURGICAL HISTORY:  History of remote cholecystectomy.  She has history of small bowel obstruction and required diagnostic laparoscopy and internal hernia reduction.  She had right inguinal hernia repair.      MEDICATIONS:  Please see medication reconciliation list.     ALLERGIES:  No known drug allergies.     FAMILY HISTORY:  Mother had pancreatic cancer.     SOCIAL HISTORY:  No tobacco, alcohol, or drug use.   Lives with her family.  Independent for basic activities of daily living.     REVIEW OF SYSTEMS:  Patient said since yesterday she has been having intense crescendo-decrescendo abdominal pain, mid abdomen and left abdomen associated with nausea and vomiting.  She vomited several times.  She had a bowel movement last night around 9 p.m.  She cannot tell me if she had passed gas since.  She has not been able to eat.  Other 12-point review of systems is negative.       PHYSICAL EXAMINATION:    GENERAL:  Alert and oriented to time, place and person.  Moderate distress.   VITAL SIGNS:  Temperature 99.9, pulse 63, respiratory rate 22, blood pressure 155/75, pulse ox 96% on room air.  HEENT:  Atraumatic.  Oropharynx clear.  Dry mucous membranes.    NECK:  Supple.  No lymphadenopathy.  Trachea midline.  Full range of motion.   LUNGS:  Clear to auscultation bilaterally.  Normal respiratory effort.    HEART:  Regular rate and rhythm.  S1 and S2 auscultated.  No murmur.    ABDOMEN:  Sensitive to palpation, slightly distended.  Positive bowel sounds.  EXTREMITIES:  No peripheral edema, clubbing or cyanosis.   NEUROLOGIC:  Motor and sensory intact.      ASSESSMENT:    1.   Partial small bowel obstruction versus ileus as outlined above.  2.   Dehydration and mild acute on chronic kidney injury.    PLAN:  Patient will be admitted to general medical floor.  IV fluids.  Monitor electrolytes and kidney function.  Pain and nausea control.  N.p.o.  General surgery consult.  Further recommendations to follow.     Dictated By Agustín Hernandez MD  d: 11/19/2024 12:13:58  t: 11/19/2024 13:30:28  Job [id number]  FB/    cc: Daniel Bradley MD

## 2024-11-19 NOTE — ED PROVIDER NOTES
Patient Seen in: [facility] Emergency Department      History     Chief Complaint   Patient presents with    Nausea/Vomiting/Diarrhea    Chest Pain Angina     Stated Complaint: Nausea/ Vomiting    Subjective:   84-year-old female with history of scleroderma, crest syndrome, bowel obstruction, hypertension, hyperlipidemia, diastolic dysfunction here with vomiting which is greenish.  The vomiting started last night around 10.  She went to another hospital through [facility] had a CTA chest abdomen pelvis.  It showed her typical bowel dilatation but she vomited there and felt a lot better so she went home a little after midnight.  Shortly thereafter she started vomiting again.  She has had multiple episodes since getting home this morning.  Last BM was 9 PM last night.  She is having a tightness in her lower chest which increases when she vomits.  Her son who is a cardiologist is here with her and he told me they did EKGs and troponins yesterday and she has chronic ST depression but they did not think this was cardiac.  She had no dissection or PE she is on Eliquis.  She is not having abdominal pain currently but at times has had some discomfort.  No focal weakness or numbness.  No cough or congestion.  She was given some oral Zofran earlier today.  Her main complaint right now is nausea.  She has had surgery for a small bowel obstruction.  The last time she had to be admitted was probably 3 years ago.  Sometimes she gets the symptoms but she vomits at home feels better and they never end up coming to the hospital.              Objective:     Past Medical History:    Arrhythmia    afib    Back problem    Disorder of thyroid    Esophageal reflux    High blood pressure    High cholesterol    Neuropathy    Problems with swallowing    Visual impairment    glasses              Past Surgical History:   Procedure Laterality Date    Cholecystectomy      Colectomy  09/14/2021     Diagnostic laparoscopy, possible  laraotomy, small bowel resection     Colonoscopy N/A 7/29/2020    Procedure: COLONOSCOPY;  Surgeon: Nacho Owen MD;  Location: [facility] ENDOSCOPY    Hernia surgery                  Social History     Socioeconomic History    Marital status:    Tobacco Use    Smoking status: Never    Smokeless tobacco: Never   Vaping Use    Vaping status: Never Used   Substance and Sexual Activity    Alcohol use: No    Drug use: No     Social Drivers of Health     Financial Resource Strain: Low Risk  (7/15/2024)    Received from [facility]    Overall Financial Resource Strain (CARDIA)     Difficulty of Paying Living Expenses: Not very hard   Food Insecurity: No Food Insecurity (7/15/2024)    Received from formerly [facility]    Hunger Vital Sign     Worried About Running Out of Food in the Last Year: Never true     Ran Out of Food in the Last Year: Never true   Transportation Needs: No Transportation Needs (7/15/2024)    Received from [facility]    PRAPARE - Transportation     Lack of Transportation (Medical): No     Lack of Transportation (Non-Medical): No   Housing Stability: High Risk (7/15/2024)    Received from formerly [facility]    Housing Stability Vital Sign     Unable to Pay for Housing in the Last Year: Yes     Number of Places Lived in the Last Year: 1     Unstable Housing in the Last Year: No                  Physical Exam     ED Triage Vitals [11/19/24 1020]   /66   Pulse 63   Resp 20   Temp 99.9 °F (37.7 °C)   Temp src    SpO2 96 %   O2 Device None (Room air)       Current Vitals:   Vital Signs  BP: (!) 167/79  Pulse: 68  Resp: 17  Temp: 99.9 °F (37.7 °C)  MAP (mmHg): 98    Oxygen Therapy  SpO2: 95 %  O2 Device: None (Room air)        Physical Exam  HENT:      Head: Normocephalic.   Eyes:      Extraocular Movements: Extraocular movements intact.   Cardiovascular:      Rate and Rhythm: Normal rate and regular rhythm.   Pulmonary:      Effort:  Pulmonary effort is normal.      Breath sounds: Normal breath sounds.   Abdominal:      Palpations: Abdomen is soft.      Comments: Slight lower abdominal tenderness with decreased bowel sounds.  No significant distention.  It is soft.   Musculoskeletal:         General: Normal range of motion.   Skin:     General: Skin is warm.      Capillary Refill: Capillary refill takes less than 2 seconds.   Neurological:      Mental Status: She is alert.      Sensory: No sensory deficit.      Motor: No weakness.             ED Course     Labs Reviewed   HEPATIC FUNCTION PANEL (7) - Abnormal; Notable for the following components:       Result Value    ALT <7 (*)     All other components within normal limits   CBC WITH DIFFERENTIAL WITH PLATELET - Abnormal; Notable for the following components:    RBC 3.55 (*)     HGB 11.3 (*)     HCT 33.4 (*)     Lymphocyte Absolute 0.70 (*)     All other components within normal limits   BASIC METABOLIC PANEL (8) - Abnormal; Notable for the following components:    Creatinine 1.15 (*)     eGFR-Cr 48 (*)     All other components within normal limits   LIPASE - Abnormal; Notable for the following components:    Lipase 54 (*)     All other components within normal limits   TROPONIN I HIGH SENSITIVITY - Normal   URINALYSIS WITH CULTURE REFLEX     EKG    Rate, intervals and axes as noted on EKG Report.  Rate: 64  Rhythm: Sinus Rhythm  Reading: Nonspecific ST abnormalities.  QT C474.  Abnormal EKG read by myself.  When compared to prior the nonspecific T waves are improved in the inferior lead.  However overall no significant changes in the EKG         ED Course as of 11/19/24 1353  ------------------------------------------------------------  Time: 11/19 1140  Comment: Labs and CT independently interpreted by me.  CBC showed mild anemia.  CMP unremarkable.  Troponin normal, lipase 54.  CT shows small bowel obstruction versus ileus.  Discussed with hospitalist Dr. Hernandez as well as updated the  surgeon Dr. Bliss who will be on consult.  ------------------------------------------------------------  Time: 11/19 1352  Comment: Chest x-ray shows good placement of the NG tube that was placed by the surgeon Dr. Valenzuela              [facility]      XR CHEST AP PORTABLE  (CPT=71045)    Result Date: 11/19/2024  CONCLUSION:   Enteric tube tip in the proximal stomach.  The distal side hole of the enteric tube projects at the level of the gastroesophageal junction.  Consider advancement.    Dictated by (CST): Andreas Bejarano MD on 11/19/2024 at 1:25 PM     Finalized by (CST): Andreas Bejarano MD on 11/19/2024 at 1:27 PM          CT ABDOMEN+PELVIS(CPT=74176)    Result Date: 11/19/2024  CONCLUSION:   Partial small bowel obstruction versus small bowel ileus.  No discrete transition point.  Extensive fecalization of the material within the distal small bowel consistent delayed transit or stasis.  Diverticulosis without diverticulitis.  No pneumatosis or pneumoperitoneum.  Small hiatal hernia with patulous distal esophagus.  Similar findings were described on outside hospital CT performed 11/18/2024.  Images not available for direct view.     Dictated by (CST): Jesse Perez MD on 11/19/2024 at 11:17 AM     Finalized by (CST): Jesse Perez MD on 11/19/2024 at 11:23 AM             Admission disposition: 11/19/2024 11:55 AM           Medical Decision Making  Patient with multiple episodes of bilious emesis, abdominal discomfort and left lower chest tightness.  Differentials vast but could include ACS, PE, dissection, bowel obstruction, bowel ischemia, pancreatitis many other possibilities.  The CAT scan from 12 hours ago shows no dissection or PE and she is anticoagulated.  Will perform EKG and troponin here but probably not ACS as this tightness has been going on since yesterday and it does worsen with vomiting.  Patient could have bowel obstruction.  I will repeat a noncontrasted abdominal CT and discussed with the surgical  team.  She declined pain medicine right now.  Will give Zofran and Protonix.    Amount and/or Complexity of Data Reviewed  External Data Reviewed: radiology and notes.     Details: I did review the notes and CAT scan from yesterday's visit at [facility]  Labs: ordered. Decision-making details documented in ED Course.  Radiology: ordered and independent interpretation performed. Decision-making details documented in ED Course.  ECG/medicine tests: ordered and independent interpretation performed. Decision-making details documented in ED Course.  Discussion of management or test interpretation with external provider(s): Patient given IV fluids, Zofran, Protonix.  Will suggest NG.  Seen by surgery in the ER.  Seen by hospitalist in the ER.  Antiemetics given.    Risk  Decision regarding hospitalization.  Risk Details: Scleroderma, small bowel obstructions, abdominal surgeries, crest syndrome        Disposition and Plan     Clinical Impression:  1. Small bowel obstruction (HCC)         Disposition:  Admit  11/19/2024 11:55 am    Follow-up:  No follow-up provider specified.  We recommend that you schedule follow up care with a primary care provider within the next three months to obtain basic health screening including reassessment of your blood pressure.      Medications Prescribed:  Current Discharge Medication List              Supplementary Documentation:         Hospital Problems       Present on Admission  Date Reviewed: 2/9/2023            ICD-10-CM Noted POA    * (Principal) Small bowel obstruction (HCC) K56.609 11/19/2024 Unknown    Anemia D64.9 11/19/2024 Yes

## 2024-11-19 NOTE — ED INITIAL ASSESSMENT (HPI)
PT c/o N/V at home since yesterday. Per family, began to have chest pain today. Covid + 3 weeks ago.

## 2024-11-19 NOTE — PLAN OF CARE
Patient is A&Ox4. VSS. RA. Patient was an ED admit today d/t SBO. NG tube to right nare-LIS. 250cc of brown fluid emptied at 1730. Patient is SBA with walker, she is continent. NPO order in place. D5.45%NS at 100cc/hr running. Call light and personal belongings are within reach.  Problem: Patient Centered Care  Goal: Patient preferences are identified and integrated in the patient's plan of care  Description: Interventions:  - What would you like us to know as we care for you?   - Provide timely, complete, and accurate information to patient/family  - Incorporate patient and family knowledge, values, beliefs, and cultural backgrounds into the planning and delivery of care  - Encourage patient/family to participate in care and decision-making at the level they choose  - Honor patient and family perspectives and choices  Outcome: Progressing     Problem: Patient/Family Goals  Goal: Patient/Family Long Term Goal    Interventions:  - See additional Care Plan goals for specific interventions  Outcome: Progressing  Goal: Patient/Family Short Term Goal    Interventions:   - See additional Care Plan goals for specific interventions  Outcome: Progressing     Problem: PAIN - ADULT  Goal: Verbalizes/displays adequate comfort level or patient's stated pain goal  Description: INTERVENTIONS:  - Encourage pt to monitor pain and request assistance  - Assess pain using appropriate pain scale  - Administer analgesics based on type and severity of pain and evaluate response  - Implement non-pharmacological measures as appropriate and evaluate response  - Consider cultural and social influences on pain and pain management  - Manage/alleviate anxiety  - Utilize distraction and/or relaxation techniques  - Monitor for opioid side effects  - Notify MD/LIP if interventions unsuccessful or patient reports new pain  - Anticipate increased pain with activity and pre-medicate as appropriate  Outcome: Progressing     Problem: RISK FOR INFECTION  - ADULT  Goal: Absence of fever/infection during anticipated neutropenic period  Description: INTERVENTIONS  - Monitor WBC  - Administer growth factors as ordered  - Implement neutropenic guidelines  Outcome: Progressing     Problem: SAFETY ADULT - FALL  Goal: Free from fall injury  Description: INTERVENTIONS:  - Assess pt frequently for physical needs  - Identify cognitive and physical deficits and behaviors that affect risk of falls.  - [place] fall precautions as indicated by assessment.  - Educate pt/family on patient safety including physical limitations  - Instruct pt to call for assistance with activity based on assessment  - Modify environment to reduce risk of injury  - Provide assistive devices as appropriate  - Consider OT/PT consult to assist with strengthening/mobility  - Encourage toileting schedule  Outcome: Progressing     Problem: DISCHARGE PLANNING  Goal: Discharge to home or other facility with appropriate resources  Description: INTERVENTIONS:  - Identify barriers to discharge w/pt and caregiver  - Include patient/family/discharge partner in discharge planning  - Arrange for needed discharge resources and transportation as appropriate  - Identify discharge learning needs (meds, wound care, etc)  - Arrange for interpreters to assist at discharge as needed  - Consider post-discharge preferences of patient/family/discharge partner  - Complete POLST form as appropriate  - Assess patient's ability to be responsible for managing their own health  - Refer to Case Management Department for coordinating discharge planning if the patient needs post-hospital services based on physician/LIP order or complex needs related to functional status, cognitive ability or social support system  Outcome: Progressing     Problem: GASTROINTESTINAL - ADULT  Goal: Minimal or absence of nausea and vomiting  Description: INTERVENTIONS:  - Maintain adequate hydration with IV or PO as ordered and tolerated  - Nasogastric  tube to low intermittent suction as ordered  - Evaluate effectiveness of ordered antiemetic medications  - Provide nonpharmacologic comfort measures as appropriate  - Advance diet as tolerated, if ordered  - Obtain nutritional consult as needed  - Evaluate fluid balance  Outcome: Progressing  Goal: Maintains or returns to baseline bowel function  Description: INTERVENTIONS:  - Assess bowel function  - Maintain adequate hydration with IV or PO as ordered and tolerated  - Evaluate effectiveness of GI medications  - Encourage mobilization and activity  - Obtain nutritional consult as needed  - Establish a toileting routine/schedule  - Consider collaborating with pharmacy to review patient's medication profile  Outcome: Progressing

## 2024-11-19 NOTE — CONSULTS
[facility]    Patient Name:  Sherry Elizondo   YOB: 1940   Gender:  Female   Appt Date:  11/19/2024   Provider:  No name on file   Insurance:  MEDICARE PART A&B     PATIENT PROVIDERS  Referring Provider: No ref. provider found   Address: No referring provider defined for this encounter.   Phone #: N/A    Primary Care Provider:Araceli Rasheed   Address: [unfilled]   Phone #: [phone]       CHIEF COMPLAINT  Chief Complaint   Patient presents with    Nausea/Vomiting/Diarrhea    Chest Pain Angina        PROBLEMS  Reviewed   Patient Active Problem List   Diagnosis    Shoulder pain    Shoulder fracture, right    Shoulder arthritis    SBO (small bowel obstruction) (HCC)    PAF (paroxysmal atrial fibrillation) (HCC)    CREST (calcinosis, Raynaud's phenomenon, esophageal dysfunction, sclerodactyly, telangiectasia) (HCC)    Hypoxia    Iron deficiency anemia    Anemia    Small bowel obstruction (HCC)        History of Present Illness:  Patient known to me.  84-year-old female with multiple medical problems including atrial fibrillation, tricuspid regurgitation and a history of scleroderma and GERD who presented to the emergency department with abdominal pain and concern for small bowel obstruction.  She does have a history of small bowel obstruction I had done a diagnostic laparoscopy and reduction of internal hernia for chronic small bowel obstruction back in 2021.  Since that time she has done relatively well.  Yesterday, she was at [facility] with abdominal pain and chest pain, CT at that time was done [unable to review images] which reportedly showed dilated small bowel with areas of small bowel thickening raising concern for enteritis versus small bowel obstruction.  She returned home however her symptoms recurred and presented again to our emergency department.  Here, workup included a CT scan which is reviewed below but essentially showed findings consistent with  small bowel obstruction without a discrete transition point.  There was extensive fecalization within the distal small bowel?  Stasis versus delayed transit.     Vital Signs:  /65   Pulse 67   Temp 99.9 °F (37.7 °C)   Resp 15   Wt 65.3 kg (144 lb)   SpO2 95%   BMI 25.51 kg/m²      Medications Reviewed:    Current Outpatient Medications:     valsartan 80 MG Oral Tab, Take 1 tablet (80 mg total) by mouth 2 (two) times daily., Disp: , Rfl:     Zolpidem Tartrate ER 12.5 MG Oral Tab CR, Take 1 tablet (12.5 mg total) by mouth nightly as needed (insomnia)., Disp: , Rfl:     pantoprazole 40 MG Oral Tab EC, Take 1 tablet (40 mg total) by mouth 2 (two) times daily before meals. 30 minutes before breakfast and dinner., Disp: 60 tablet, Rfl: 5    apixaban 5 MG Oral Tab, Take 1 tablet (5 mg total) by mouth 2 (two) times daily., Disp: , Rfl: 0    famotidine 20 MG Oral Tab, Take 1 tablet (20 mg total) by mouth daily as needed for Heartburn., Disp: , Rfl:     torsemide 20 MG Oral Tab, Take 1 tablet (20 mg total) by mouth 2 (two) times daily., Disp: 60 tablet, Rfl: 0    acetaminophen 500 MG Oral Tab, Take 2 tablets (1,000 mg total) by mouth every 6 (six) hours as needed., Disp: , Rfl:     Levothyroxine Sodium 125 MCG Oral Tab, Take 1 tablet (125 mcg total) by mouth before breakfast., Disp: , Rfl:     amiodarone HCl 200 MG Oral Tab, Take 1 tablet (200 mg total) by mouth 2 (two) times daily., Disp: , Rfl:     cyanocobalamin 1000 MCG/ML Injection Solution, Inject 1 mL (1,000 mcg total) into the muscle every 30 (thirty) days., Disp: 3 mL, Rfl: 3     Allergies Reviewed:  Allergies[1]     History:  Reviewed:  Past Medical History:    Arrhythmia    afib    Back problem    Disorder of thyroid    Esophageal reflux    High blood pressure    High cholesterol    Neuropathy    Problems with swallowing    Visual impairment    glasses      Reviewed:  Past Surgical History:   Procedure Laterality Date    Cholecystectomy      Colectomy   09/14/2021     Diagnostic laparoscopy, possible laraotomy, small bowel resection     Colonoscopy N/A 7/29/2020    Procedure: COLONOSCOPY;  Surgeon: Nacho Owen MD;  Location: [facility] ENDOSCOPY    Hernia surgery        Reviewed Social History:  Social History     Socioeconomic History    Marital status:    Tobacco Use    Smoking status: Never    Smokeless tobacco: Never   Vaping Use    Vaping status: Never Used   Substance and Sexual Activity    Alcohol use: No    Drug use: No      Reviewed:  Family History   Problem Relation Age of Onset    Pancreatic Cancer Mother         Review of Systems:  Review of Systems   Constitutional:  Negative for activity change, appetite change, chills, fatigue, fever and unexpected weight change.   HENT:  Negative for congestion and trouble swallowing.    Eyes:  Negative for discharge and redness.   Respiratory:  Negative for cough, chest tightness and shortness of breath.    Cardiovascular:  Negative for chest pain and leg swelling.   Gastrointestinal:  Positive for abdominal distention, abdominal pain, nausea and vomiting.   Endocrine: Negative for polydipsia and polyuria.   Genitourinary:  Negative for difficulty urinating and dysuria.   Musculoskeletal:  Negative for myalgias.   Skin:  Negative for color change and pallor.   Allergic/Immunologic: Negative for immunocompromised state.   Neurological:  Negative for syncope and weakness.   Hematological:  Does not bruise/bleed easily.   Psychiatric/Behavioral:  Negative for agitation and confusion.         Physical Examination:  Physical Exam  Constitutional:       Appearance: She is well-developed.   HENT:      Head: Normocephalic.   Eyes:      Pupils: Pupils are equal, round, and reactive to light.   Cardiovascular:      Rate and Rhythm: Normal rate and regular rhythm.      Heart sounds: No murmur heard.  Pulmonary:      Effort: Pulmonary effort is normal. No respiratory distress.      Breath sounds: Normal breath sounds. No  wheezing or rales.   Abdominal:      General: There is distension.      Palpations: Abdomen is soft.      Tenderness: There is no abdominal tenderness.   Musculoskeletal:      Cervical back: Normal range of motion.   Skin:     General: Skin is warm and dry.   Neurological:      Mental Status: She is alert and oriented to person, place, and time.        XR CHEST AP PORTABLE  (CPT=71045)    Result Date: 11/19/2024  CONCLUSION:   Enteric tube tip in the proximal stomach.  The distal side hole of the enteric tube projects at the level of the gastroesophageal junction.  Consider advancement.    Dictated by (CST): Andreas Bejarano MD on 11/19/2024 at 1:25 PM     Finalized by (CST): Andreas Bejarano MD on 11/19/2024 at 1:27 PM          CT ABDOMEN+PELVIS(CPT=74176)    Result Date: 11/19/2024  CONCLUSION:   Partial small bowel obstruction versus small bowel ileus.  No discrete transition point.  Extensive fecalization of the material within the distal small bowel consistent delayed transit or stasis.  Diverticulosis without diverticulitis.  No pneumatosis or pneumoperitoneum.  Small hiatal hernia with patulous distal esophagus.  Similar findings were described on outside hospital CT performed 11/18/2024.  Images not available for direct view.     Dictated by (CST): Jesse Perez MD on 11/19/2024 at 11:17 AM     Finalized by (CST): Jesse Perez MD on 11/19/2024 at 11:23 AM            Recent Labs   Lab 11/19/24  1052   RBC 3.55*   HGB 11.3*   HCT 33.4*   MCV 94.1   MCH 31.8   MCHC 33.8   RDW 13.6   NEPRELIM 4.68   WBC 5.9   .0       Recent Labs   Lab 11/19/24  1052   GLU 94   BUN 17   CREATSERUM 1.15*   EGFRCR 48*   CA 10.1   ALB 4.2      K 4.9      CO2 30.0   ALKPHO 81   AST <8   ALT <7*   BILT 0.7   TP 7.8       Assessment / Plan:  Partial small bowel obstruction versus ileus  NG tube to low intermittent suction  IV fluids  May have sips of clears and ice chips for comfort  Serial abdominal exams  No  acute surgical problems    Elmer Juarez MD  Complex General Surgical Oncology  [place]  [email or web address]       Follow Up:  No follow-ups on file.       Electronically Signed by: No name on file         [1] No Known Allergies

## 2024-11-19 NOTE — ED QUICK NOTES
Orders for admission, patient is aware of plan and ready to go upstairs. Any questions, please call ED RN Adwoa at extension [phone].     Patient Covid vaccination status: Fully vaccinated     COVID Test Ordered in ED: None    COVID Suspicion at Admission: N/A    Running Infusions:      Mental Status/LOC at time of transport: x4    Other pertinent information:   CIWA score: N/A   NIH score:  N/A

## 2024-11-19 NOTE — CONSULTS
[facility]  Report of Consultation  Is this a shared or split note between Advanced Practice Provider and Physician? Yes   Sherry Elizondo Patient Status:  Emergency    1940 MRN [id number]   Location [facility] EMERGENCY DEPARTMENT Attending Daniel Bradley MD   Hosp Day # 0 PCP Araceli Rasheed     Requesting Physician:  Daniel Bradley MD     Reason for Consultation:  Small bowel obstruction    Chief Complaint:  Chest pain, nausea, and vomiting    History of Present Illness:  Sherry Elizondo is a 84 year old female w/ PMH a-fib on eliquis, CREST, tricuspid regurgitation, HTN, GERD, hypothyroidism, history of recurrent SBO, who presents to [facility] on 24 for chest pain, nausea, and vomiting starting yesterday. She reports feeling well up until yesterday when her symptoms began. Patient has a history of scleroderma with similar symptoms that occur during her flares. She called 911 and was seen at [facility].     At [facility], intial EKG read with possible posterior STEMI, repeat EKG with EMS with NSR and no obvious STEMI. Troponin 8, HGB 10.7, lactic acid 1.0, Patient was admitted and CT angio chest done for evaluation of multiple dissection with findings of no acute aortic pathology, dilated loops of bowel without focal transition point, multiple areas of mild bowel wall thickening. Repeat troponin wnl. Patient's condition was improved without chest pain, nausea, or vomiting since admission and was discharged to home early this morning.     Upon returning home, the patient began to develop again intractable nausea and bilious emesis, without hematemesis. She reported persistent sternal chest pain and \"not feeling like normal\". Her last bowel movement was yesterday evening, she does not report issues with constipation or diarrhea. She denies fever or chills.     Upon presentation to the hospital, patient afebrile and hemodynamically stable. Hgb 11.1.  Troponin  10.  Lipase 54.  Creatinine 1.15. ALT <7.  CTAP with findings of partial small bowel obstruction versus small bowel ileus, no discrete transition point.  Extensive fecalization of material within distal small bowel consistent with delayed transit or stasis.  Small hiatal hernia with patulous distal esophagus.  Findings similar to outside hospital CT performed 11/18. She still has nausea despite medication administration, has not vomited since arriving to ED. She has persistent abdominal pain.       History:  Past Medical History:    Arrhythmia    afib    Back problem    Disorder of thyroid    Esophageal reflux    High blood pressure    High cholesterol    Neuropathy    Problems with swallowing    Visual impairment    glasses     Past Surgical History:   Procedure Laterality Date    Cholecystectomy      Colectomy  09/14/2021     Diagnostic laparoscopy, possible laraotomy, small bowel resection     Colonoscopy N/A 7/29/2020    Procedure: COLONOSCOPY;  Surgeon: Nacho Owen MD;  Location: [facility] ENDOSCOPY    Hernia surgery       Family History   Problem Relation Age of Onset    Pancreatic Cancer Mother       reports that she has never smoked. She has never used smokeless tobacco. She reports that she does not drink alcohol and does not use drugs.    Allergies:  Allergies[1]    Medications:  (Not in a hospital admission)        Current Facility-Administered Medications:     sodium chloride 0.9 % IV bolus 1,000 mL, 1,000 mL, Intravenous, Once    Review of Systems:  Review of Systems   Constitutional:  Negative for chills, fatigue and fever.   Respiratory:  Negative for shortness of breath and wheezing.    Cardiovascular:  Positive for chest pain. Negative for leg swelling.   Gastrointestinal:  Positive for nausea, vomiting and abdominal pain. Negative for diarrhea and constipation.   Genitourinary:  Negative for dysuria, hematuria and flank pain.   Neurological:  Negative for dizziness and weakness.       Physical  Exam:  /75   Pulse 63   Temp 99.9 °F (37.7 °C)   Resp 22   Wt 144 lb (65.3 kg)   SpO2 96%   BMI 25.51 kg/m²   Physical Exam  Constitutional:       General: She is not in acute distress.     Appearance: Normal appearance. She is not ill-appearing.      Comments: Frail and cachectic    HENT:      Head: Normocephalic and atraumatic.      Mouth/Throat:      Mouth: Mucous membranes are moist.      Pharynx: Oropharynx is clear.   Eyes:      Conjunctiva/sclera: Conjunctivae normal.   Cardiovascular:      Rate and Rhythm: Normal rate and regular rhythm.      Pulses: Normal pulses.      Heart sounds: Normal heart sounds.   Pulmonary:      Effort: Pulmonary effort is normal.      Breath sounds: Normal breath sounds.   Abdominal:      Palpations: Abdomen is soft.      Tenderness: There is abdominal tenderness. There is no guarding or rebound.      Comments: Mild epigastric discomfort. Moderate tenderness to palpation in RLQ and suprapubic area.    Neurological:      General: No focal deficit present.      Mental Status: She is alert and oriented to person, place, and time.   Psychiatric:         Mood and Affect: Mood normal.         Behavior: Behavior normal.         Laboratory Data:  Recent Labs   Lab 11/19/24  1052   RBC 3.55*   HGB 11.3*   HCT 33.4*   MCV 94.1   MCH 31.8   MCHC 33.8   RDW 13.6   NEPRELIM 4.68   WBC 5.9   .0       Recent Labs   Lab 11/19/24  1052   GLU 94   BUN 17   CREATSERUM 1.15*   CA 10.1   ALB 4.2      K 4.9      CO2 30.0   ALKPHO 81   AST <8   ALT <7*   BILT 0.7   TP 7.8         No results for input(s): \"PTP\", \"INR\", \"PTT\" in the last 168 hours.      CT ABDOMEN+PELVIS(CPT=74176)    Result Date: 11/19/2024  CONCLUSION:   Partial small bowel obstruction versus small bowel ileus.  No discrete transition point.  Extensive fecalization of the material within the distal small bowel consistent delayed transit or stasis.  Diverticulosis without diverticulitis.  No pneumatosis or  pneumoperitoneum.  Small hiatal hernia with patulous distal esophagus.  Similar findings were described on outside hospital CT performed 11/18/2024.  Images not available for direct view.     Dictated by (CST): Jesse Perez MD on 11/19/2024 at 11:17 AM     Finalized by (CST): Jesse Perez MD on 11/19/2024 at 11:23 AM                     Medical Decision Making         Impression:  Patient Active Problem List   Diagnosis    Shoulder pain    Shoulder fracture, right    Shoulder arthritis    SBO (small bowel obstruction) (HCC)    PAF (paroxysmal atrial fibrillation) (HCC)    CREST (calcinosis, Raynaud's phenomenon, esophageal dysfunction, sclerodactyly, telangiectasia) (HCC)    Hypoxia    Iron deficiency anemia    Anemia    Small bowel obstruction (HCC)       Recurrent partial small bowel obstruction without discrete transition point.     Plan:  Patient seen cartside with Dr. Mark. No acute surgical intervention indicated at this time. Serial imaging as clinically indicated.   Recommended NG tube insertion to help alleviate nausea and vomiting and for decompression of bowels. Patient and family bedside wish to defer placement at this time. Will re-visit discussion later today once patient and family have discussed.   Keep NPO, ok for ice chips  Analgesics and antiemetics as needed    Thank you for allowing me to participate in the care of the patient.   NATALI Hanson  11/19/2024  11:52 AM        The patient was independently examined. Agree with the PA's assessment and plan.       Gabi Mark MD  [facility] - General Surgery   [address] [place] [address]  p [phone]        [1] No Known Allergies

## 2024-11-20 ENCOUNTER — APPOINTMENT (OUTPATIENT)
Dept: GENERAL RADIOLOGY | Facility: HOSPITAL | Age: 84
End: 2024-11-20
Payer: MEDICARE

## 2024-11-20 ENCOUNTER — APPOINTMENT (OUTPATIENT)
Dept: GENERAL RADIOLOGY | Facility: HOSPITAL | Age: 84
DRG: 389 | End: 2024-11-20
Payer: MEDICARE

## 2024-11-20 LAB
ANION GAP SERPL CALC-SCNC: 6 MMOL/L (ref 0–18)
BASOPHILS # BLD AUTO: 0.03 X10(3) UL (ref 0–0.2)
BASOPHILS NFR BLD AUTO: 0.6 %
BILIRUB UR QL: NEGATIVE
BUN BLD-MCNC: 16 MG/DL (ref 9–23)
BUN/CREAT SERPL: 16.2 (ref 10–20)
CALCIUM BLD-MCNC: 9.4 MG/DL (ref 8.7–10.4)
CHLORIDE SERPL-SCNC: 103 MMOL/L (ref 98–112)
CLARITY UR: CLEAR
CO2 SERPL-SCNC: 30 MMOL/L (ref 21–32)
CREAT BLD-MCNC: 0.99 MG/DL
DEPRECATED RDW RBC AUTO: 48.7 FL (ref 35.1–46.3)
EGFRCR SERPLBLD CKD-EPI 2021: 57 ML/MIN/1.73M2 (ref 60–?)
EOSINOPHIL # BLD AUTO: 0.07 X10(3) UL (ref 0–0.7)
EOSINOPHIL NFR BLD AUTO: 1.4 %
ERYTHROCYTE [DISTWIDTH] IN BLOOD BY AUTOMATED COUNT: 13.8 % (ref 11–15)
GLUCOSE BLD-MCNC: 98 MG/DL (ref 70–99)
GLUCOSE UR-MCNC: NORMAL MG/DL
HCT VFR BLD AUTO: 33.5 %
HGB BLD-MCNC: 11.3 G/DL
HGB UR QL STRIP.AUTO: NEGATIVE
IMM GRANULOCYTES # BLD AUTO: 0.01 X10(3) UL (ref 0–1)
IMM GRANULOCYTES NFR BLD: 0.2 %
KETONES UR-MCNC: NEGATIVE MG/DL
LEUKOCYTE ESTERASE UR QL STRIP.AUTO: NEGATIVE
LYMPHOCYTES # BLD AUTO: 0.83 X10(3) UL (ref 1–4)
LYMPHOCYTES NFR BLD AUTO: 16.2 %
MCH RBC QN AUTO: 32.3 PG (ref 26–34)
MCHC RBC AUTO-ENTMCNC: 33.7 G/DL (ref 31–37)
MCV RBC AUTO: 95.7 FL
MONOCYTES # BLD AUTO: 0.51 X10(3) UL (ref 0.1–1)
MONOCYTES NFR BLD AUTO: 10 %
NEUTROPHILS # BLD AUTO: 3.66 X10 (3) UL (ref 1.5–7.7)
NEUTROPHILS # BLD AUTO: 3.66 X10(3) UL (ref 1.5–7.7)
NEUTROPHILS NFR BLD AUTO: 71.6 %
NITRITE UR QL STRIP.AUTO: NEGATIVE
OSMOLALITY SERPL CALC.SUM OF ELEC: 289 MOSM/KG (ref 275–295)
PH UR: 7 [PH] (ref 5–8)
PLATELET # BLD AUTO: 180 10(3)UL (ref 150–450)
POTASSIUM SERPL-SCNC: 4 MMOL/L (ref 3.5–5.1)
PROT UR-MCNC: NEGATIVE MG/DL
RBC # BLD AUTO: 3.5 X10(6)UL
SODIUM SERPL-SCNC: 139 MMOL/L (ref 136–145)
SP GR UR STRIP: 1.01 (ref 1–1.03)
UROBILINOGEN UR STRIP-ACNC: NORMAL
WBC # BLD AUTO: 5.1 X10(3) UL (ref 4–11)

## 2024-11-20 PROCEDURE — 74018 RADEX ABDOMEN 1 VIEW: CPT

## 2024-11-20 PROCEDURE — 99233 SBSQ HOSP IP/OBS HIGH 50: CPT | Performed by: HOSPITALIST

## 2024-11-20 RX ORDER — AMIODARONE HYDROCHLORIDE 200 MG/1
200 TABLET ORAL DAILY
Status: DISCONTINUED | OUTPATIENT
Start: 2024-11-20 | End: 2024-11-23

## 2024-11-20 NOTE — PROGRESS NOTES
[facility]  Progress Note    Sherry Elizondo Patient Status:  Inpatient    1940 MRN [id number]   Location [facility] 4W/SW/SE Attending Germania Randall MD   Hosp Day # 1 PCP Araceli Rasheed     Subjective:  Pt seen laying in bed. Reports feeling a little better today. Passing scant flatus, no bms. No nausea or vomiting. NGT is irritating throat    Objective/Physical Exam:  General: Alert, orientated x3.  Cooperative.  No apparent distress.  Vital Signs:  Blood pressure 101/49, pulse 63, temperature 98.6 °F (37 °C), temperature source Oral, resp. rate 14, weight 144 lb (65.3 kg), SpO2 93%.  Wt Readings from Last 3 Encounters:   24 144 lb (65.3 kg)   23 140 lb (63.5 kg)   22 160 lb (72.6 kg)     Lungs: No respiratory distress.  Cardiac: Regular rate and rhythm.   Abdomen:  Soft, mildly distended, suprapubic tender, with no rebound or guarding.  No peritoneal signs.   Extremities:  No lower extremity edema noted.      Intake/Output:    Intake/Output Summary (Last 24 hours) at 2024 0936  Last data filed at 2024 0630  Gross per 24 hour   Intake 1000 ml   Output 2775 ml   Net -1775 ml     I/O last 3 completed shifts:  In: 1000 [I.V.:1000]  Out: 2775 [Urine:1375; Emesis/NG output:1400]  No intake/output data recorded.    Medications:     Labs:  Lab Results   Component Value Date    WBC 5.1 2024    HGB 11.3 2024    HCT 33.5 2024    .0 2024     Lab Results   Component Value Date     2024    K 4.0 2024     2024    CO2 30.0 2024    BUN 16 2024    CREATSERUM 0.99 2024    GLU 98 2024    CA 9.4 2024    ALKPHO 81 2024    ALT <7 2024    AST <8 2024    BILT 0.7 2024    ALB 4.2 2024    TP 7.8 2024     No results found for: \"PT\", \"INR\"      Assessment  Patient Active Problem List   Diagnosis    Shoulder pain    Shoulder fracture, right    Shoulder  arthritis    SBO (small bowel obstruction) (HCC)    PAF (paroxysmal atrial fibrillation) (HCC)    CREST (calcinosis, Raynaud's phenomenon, esophageal dysfunction, sclerodactyly, telangiectasia) (HCC)    Hypoxia    Iron deficiency anemia    Anemia    Small bowel obstruction (HCC)       Partial small bowel obstruction    Plan:  No acute surgical intervention indicated at this time  Continue non-operative management, including NGT to LIS, NPO except sips of clears, and IVF  Repeat imaging as clinically indicated  Ambulate and up to chair      Quality:  DVT Mechanical Prophylaxis:   SCDs,    DVT Pharmacologic Prophylaxis   Medication   None                Code Status: Full Code  Pollock: No urinary catheter in place  Pollock Duration (in days):   Central line:    RUBENS: 11/21/2024        Alvaro Negro PA-C  11/20/2024  9:36 AM    Agree with above  No acute surgical issues     Elmer Juarez MD  Complex General Surgical Oncology  [facility]  [email or web address]

## 2024-11-20 NOTE — PHYSICAL THERAPY NOTE
PHYSICAL THERAPY EVALUATION - INPATIENT     Room Number: 412/412-A  Evaluation Date: 2024  Type of Evaluation: Initial   Physician Order: PT Eval and Treat    Presenting Problem: partial SBO with NG tube     Reason for Therapy: Mobility Dysfunction and Discharge Planning    PHYSICAL THERAPY ASSESSMENT   Patient is a 84 year old female admitted 2024.  Prior to admission, patient's baseline is independent.  Patient is currently functioning near baseline with bed mobility, transfers, and gait.  Patient is requiring stand-by assist as a result of the following impairments: impaired dynamic balance, recommend use of RW.  Physical Therapy will continue to follow for duration of hospitalization.    Patient will benefit from continued skilled PT Services at discharge to promote prior level of function.  Anticipate patient will return home with home health PT. RW for discharge    PLAN DURING HOSPITALIZATION  Nursing Mobility Recommendation : 1 Assist     PT Treatment Plan: Bed mobility;Patient education;Family education;Gait training;Transfer training  Rehab Potential : Fair  Frequency (Obs): 5x/week     PHYSICAL THERAPY MEDICAL/SOCIAL HISTORY   History related to current admission:      Problem List  Principal Problem:    Small bowel obstruction (HCC)  Active Problems:    Anemia      HOME SITUATION  Type of Home: Condo  Home Layout: One level;Elevator                          Drives: Yes         Prior Level of [place]: ind    SUBJECTIVE  \"I need to walk\"    PHYSICAL THERAPY EXAMINATION   OBJECTIVE          WEIGHT BEARING RESTRICTION       PAIN ASSESSMENT  [place]          COGNITION  Overall Cognitive Status:  WFL - within functional limits    RANGE OF MOTION AND STRENGTH ASSESSMENT  Upper extremity ROM and strength are within functional limits   Lower extremity ROM is within functional limits   Lower extremity strength is within functional limits     BALANCE  Static Sitting: Good  Dynamic Sitting:  Good  Static Standing: Fair -  Dynamic Standing: Fair -    ADDITIONAL TESTS                                    NEUROLOGICAL FINDINGS                      ACTIVITY TOLERANCE                         O2 WALK       AM-PAC '6-Clicks' INPATIENT SHORT FORM - BASIC MOBILITY  How much difficulty does the patient currently have...  Patient Difficulty: Turning over in bed (including adjusting bedclothes, sheets and blankets)?: A Little   Patient Difficulty: Sitting down on and standing up from a chair with arms (e.g., wheelchair, bedside commode, etc.): A Little   Patient Difficulty: Moving from lying on back to sitting on the side of the bed?: A Little   How much help from another person does the patient currently need...   Help from Another: Moving to and from a bed to a chair (including a wheelchair)?: A Little   Help from Another: Need to walk in hospital room?: A Little   Help from Another: Climbing 3-5 steps with a railing?: A Little     AM-PAC Score:  Raw Score: 18   Approx Degree of Impairment: 46.58%   Standardized Score (AM-PAC Scale): 43.63   CMS Modifier (G-Code): CK    FUNCTIONAL ABILITY STATUS  Functional Mobility/Gait Assessment  Gait Assistance: Supervision  Distance (ft): 200  Assistive Device: Rolling walker  Rolling: supervision  Supine to Sit: supervision  Sit to Supine: supervision  Sit to Stand: supervision    Exercise/Education Provided:  Gait training  Assistive device recommendation    Skilled Therapy Provided: activity pacing and use of RW    The patient's Approx Degree of Impairment: 46.58% has been calculated based on documentation in the [facility] '6 clicks' Inpatient Basic Mobility Short Form.  Research supports that patients with this level of impairment may benefit from HH.  Final disposition will be made by interdisciplinary medical team.    Patient End of Session: Up in chair    CURRENT GOALS  Goals to be met by: 11/27  Patient Goal Patient's self-stated goal is: home   Goal #1 Patient is able to  demonstrate supine - sit EOB @ level: supervision     Goal #1   Current Status    Goal #2 Patient is able to demonstrate transfers Sit to/from Stand at assistance level: supervision with walker - rolling     Goal #2  Current Status    Goal #3 Patient is able to ambulate 300 feet with assist device: walker - rolling at assistance level: supervision   Goal #3   Current Status    Goal #4 Patient will negotiate 3 stairs/one curb w/ assistive device and supervision   Goal #4   Current Status    Goal #5 Patient to demonstrate independence with home activity/exercise instructions provided to patient in preparation for discharge.   Goal #5   Current Status    Goal #6    Goal #6  Current Status      Patient Evaluation Complexity Level:  History Low - no personal factors and/or co-morbidities   Examination of body systems Low -  addressing 1-2 elements   Clinical Presentation Low- Stable   Clinical Decision Making  Low Complexity     Gait Training: 15 minutes

## 2024-11-20 NOTE — PLAN OF CARE
Problem: Patient Centered Care  Goal: Patient preferences are identified and integrated in the patient's plan of care  Description: Interventions:  - What would you like us to know as we care for you?   - Provide timely, complete, and accurate information to patient/family  - Incorporate patient and family knowledge, values, beliefs, and cultural backgrounds into the planning and delivery of care  - Encourage patient/family to participate in care and decision-making at the level they choose  - Honor patient and family perspectives and choices  Outcome: Progressing     Problem: Patient/Family Goals  Goal: Patient/Family Long Term Goal  Description: Patient's Long Term Goal:     Interventions:  -   - See additional Care Plan goals for specific interventions  Outcome: Progressing  Goal: Patient/Family Short Term Goal  Description: Patient's Short Term Goal:     Interventions:   -   - See additional Care Plan goals for specific interventions  Outcome: Progressing     Problem: PAIN - ADULT  Goal: Verbalizes/displays adequate comfort level or patient's stated pain goal  Description: INTERVENTIONS:  - Encourage pt to monitor pain and request assistance  - Assess pain using appropriate pain scale  - Administer analgesics based on type and severity of pain and evaluate response  - Implement non-pharmacological measures as appropriate and evaluate response  - Consider cultural and social influences on pain and pain management  - Manage/alleviate anxiety  - Utilize distraction and/or relaxation techniques  - Monitor for opioid side effects  - Notify MD/LIP if interventions unsuccessful or patient reports new pain  - Anticipate increased pain with activity and pre-medicate as appropriate  Outcome: Progressing     Problem: RISK FOR INFECTION - ADULT  Goal: Absence of fever/infection during anticipated neutropenic period  Description: INTERVENTIONS  - Monitor WBC  - Administer growth factors as ordered  - Implement neutropenic  guidelines  Outcome: Progressing     Problem: SAFETY ADULT - FALL  Goal: Free from fall injury  Description: INTERVENTIONS:  - Assess pt frequently for physical needs  - Identify cognitive and physical deficits and behaviors that affect risk of falls.  - [place] fall precautions as indicated by assessment.  - Educate pt/family on patient safety including physical limitations  - Instruct pt to call for assistance with activity based on assessment  - Modify environment to reduce risk of injury  - Provide assistive devices as appropriate  - Consider OT/PT consult to assist with strengthening/mobility  - Encourage toileting schedule  Outcome: Progressing     Problem: DISCHARGE PLANNING  Goal: Discharge to home or other facility with appropriate resources  Description: INTERVENTIONS:  - Identify barriers to discharge w/pt and caregiver  - Include patient/family/discharge partner in discharge planning  - Arrange for needed discharge resources and transportation as appropriate  - Identify discharge learning needs (meds, wound care, etc)  - Arrange for interpreters to assist at discharge as needed  - Consider post-discharge preferences of patient/family/discharge partner  - Complete POLST form as appropriate  - Assess patient's ability to be responsible for managing their own health  - Refer to Case Management Department for coordinating discharge planning if the patient needs post-hospital services based on physician/LIP order or complex needs related to functional status, cognitive ability or social support system  Outcome: Progressing     Problem: GASTROINTESTINAL - ADULT  Goal: Minimal or absence of nausea and vomiting  Description: INTERVENTIONS:  - Maintain adequate hydration with IV or PO as ordered and tolerated  - Nasogastric tube to low intermittent suction as ordered  - Evaluate effectiveness of ordered antiemetic medications  - Provide nonpharmacologic comfort measures as appropriate  - Advance diet as  tolerated, if ordered  - Obtain nutritional consult as needed  - Evaluate fluid balance  Outcome: Progressing  Goal: Maintains or returns to baseline bowel function  Description: INTERVENTIONS:  - Assess bowel function  - Maintain adequate hydration with IV or PO as ordered and tolerated  - Evaluate effectiveness of GI medications  - Encourage mobilization and activity  - Obtain nutritional consult as needed  - Establish a toileting routine/schedule  - Consider collaborating with pharmacy to review patient's medication profile  Outcome: Progressing

## 2024-11-20 NOTE — PLAN OF CARE
Problem: Patient Centered Care  Goal: Patient preferences are identified and integrated in the patient's plan of care  Description: Interventions:  - What would you like us to know as we care for you? I would like to walk with assistance.  - Provide timely, complete, and accurate information to patient/family  - Incorporate patient and family knowledge, values, beliefs, and cultural backgrounds into the planning and delivery of care  - Encourage patient/family to participate in care and decision-making at the level they choose  - Honor patient and family perspectives and choices  Outcome: Progressing     Problem: Patient/Family Goals  Goal: Patient/Family Long Term Goal  Description: Patient's Long Term Goal: to have resolved bowel obstruction.    Interventions:  - surgical consult.  - See additional Care Plan goals for specific interventions  Outcome: Progressing  Goal: Patient/Family Short Term Goal  Description: Patient's Short Term Goal: to have NG tube removed.    Interventions:   - surgical consult. Strict I/O.  - See additional Care Plan goals for specific interventions  Outcome: Progressing     Problem: PAIN - ADULT  Goal: Verbalizes/displays adequate comfort level or patient's stated pain goal  Description: INTERVENTIONS:  - Encourage pt to monitor pain and request assistance  - Assess pain using appropriate pain scale  - Administer analgesics based on type and severity of pain and evaluate response  - Implement non-pharmacological measures as appropriate and evaluate response  - Consider cultural and social influences on pain and pain management  - Manage/alleviate anxiety  - Utilize distraction and/or relaxation techniques  - Monitor for opioid side effects  - Notify MD/LIP if interventions unsuccessful or patient reports new pain  - Anticipate increased pain with activity and pre-medicate as appropriate  Outcome: Progressing     Problem: RISK FOR INFECTION - ADULT  Goal: Absence of fever/infection  during anticipated neutropenic period  Description: INTERVENTIONS  - Monitor WBC  - Administer growth factors as ordered  - Implement neutropenic guidelines  Outcome: Progressing     Problem: SAFETY ADULT - FALL  Goal: Free from fall injury  Description: INTERVENTIONS:  - Assess pt frequently for physical needs  - Identify cognitive and physical deficits and behaviors that affect risk of falls.  - [place] fall precautions as indicated by assessment.  - Educate pt/family on patient safety including physical limitations  - Instruct pt to call for assistance with activity based on assessment  - Modify environment to reduce risk of injury  - Provide assistive devices as appropriate  - Consider OT/PT consult to assist with strengthening/mobility  - Encourage toileting schedule  Outcome: Progressing     Problem: DISCHARGE PLANNING  Goal: Discharge to home or other facility with appropriate resources  Description: INTERVENTIONS:  - Identify barriers to discharge w/pt and caregiver  - Include patient/family/discharge partner in discharge planning  - Arrange for needed discharge resources and transportation as appropriate  - Identify discharge learning needs (meds, wound care, etc)  - Arrange for interpreters to assist at discharge as needed  - Consider post-discharge preferences of patient/family/discharge partner  - Complete POLST form as appropriate  - Assess patient's ability to be responsible for managing their own health  - Refer to Case Management Department for coordinating discharge planning if the patient needs post-hospital services based on physician/LIP order or complex needs related to functional status, cognitive ability or social support system  Outcome: Progressing     Problem: GASTROINTESTINAL - ADULT  Goal: Minimal or absence of nausea and vomiting  Description: INTERVENTIONS:  - Maintain adequate hydration with IV or PO as ordered and tolerated  - Nasogastric tube to low intermittent suction as  ordered  - Evaluate effectiveness of ordered antiemetic medications  - Provide nonpharmacologic comfort measures as appropriate  - Advance diet as tolerated, if ordered  - Obtain nutritional consult as needed  - Evaluate fluid balance  Outcome: Progressing  Goal: Maintains or returns to baseline bowel function  Description: INTERVENTIONS:  - Assess bowel function  - Maintain adequate hydration with IV or PO as ordered and tolerated  - Evaluate effectiveness of GI medications  - Encourage mobilization and activity  - Obtain nutritional consult as needed  - Establish a toileting routine/schedule  - Consider collaborating with pharmacy to review patient's medication profile  Outcome: Progressing   NG tube is still in place and draining . Voids up to the bathroom. NPO maintained. Walked up in the hallway.

## 2024-11-20 NOTE — CM/SW NOTE
Per rounds patient will be home with no needs.     CM will remain available PRN.     Karen CENTENO, 11/20/24, 11:02 AM

## 2024-11-20 NOTE — PROGRESS NOTES
[facility]  part of [facility]    Progress Note    Sherry Elizondo Patient Status:  Inpatient    1940 MRN [id number]   Location [facility] 4W/SW/SE Attending Germania Randall MD   Hosp Day # 1 PCP Araceli Rasheed     Chief Complaint: abd pain    SUBJECTIVE:    No acute events overnight.  Patient denies chest pain, sob.  No fevers/chills.  She is feeling a little better today.  Reporting increased urinary frequency.     10 ROS completed and otherwise negative.    OBJECTIVE:  Vital signs in last 24 hours:  /49 (BP Location: Left arm)   Pulse 63   Temp 98.6 °F (37 °C) (Oral)   Resp 14   Wt 144 lb (65.3 kg)   SpO2 93%   BMI 25.51 kg/m²     Intake/Output:    Intake/Output Summary (Last 24 hours) at 2024 1448  Last data filed at 2024 1435  Gross per 24 hour   Intake 800 ml   Output 3670 ml   Net -2870 ml       Wt Readings from Last 3 Encounters:   24 144 lb (65.3 kg)   23 140 lb (63.5 kg)   22 160 lb (72.6 kg)       Exam     Gen: No acute distress  Pulm: Lungs clear, normal respiratory effort  CV: Heart with regular rate and rhythm  Abd: Abdomen soft, nontender, bowel sounds present  Neuro: No acute focal deficits  MSK: moves extremities  Skin: Warm and dry  Psych: Normal affect  Ext: no c/c/e    Data Review:     Labs:   Lab Results   Component Value Date    WBC 5.1 2024    HGB 11.3 2024    HCT 33.5 2024    .0 2024    CREATSERUM 0.99 2024    BUN 16 2024     2024    K 4.0 2024     2024    CO2 30.0 2024    GLU 98 2024    CA 9.4 2024       Imaging: Reviewed    XR CHEST AP PORTABLE  (CPT=71045)    Result Date: 2024  CONCLUSION:   Enteric tube tip in the proximal stomach.  The distal side hole of the enteric tube projects at the level of the gastroesophageal junction.  Consider advancement.    Dictated by (CST): Andreas Bejarano MD on 2024 at  1:25 PM     Finalized by (CST): Andreas Bejarano MD on 11/19/2024 at 1:27 PM          CT ABDOMEN+PELVIS(CPT=74176)    Result Date: 11/19/2024  CONCLUSION:   Partial small bowel obstruction versus small bowel ileus.  No discrete transition point.  Extensive fecalization of the material within the distal small bowel consistent delayed transit or stasis.  Diverticulosis without diverticulitis.  No pneumatosis or pneumoperitoneum.  Small hiatal hernia with patulous distal esophagus.  Similar findings were described on outside hospital CT performed 11/18/2024.  Images not available for direct view.     Dictated by (CST): Jesse Perez MD on 11/19/2024 at 11:17 AM     Finalized by (CST): Jesse Perez MD on 11/19/2024 at 11:23 AM           Meds:   Current Facility-Administered Medications   Medication Dose Route Frequency    amiodarone (Pacerone) tab 200 mg  200 mg Per NG Tube Daily    morphINE PF 2 MG/ML injection 1 mg  1 mg Intravenous Q2H PRN    Or    morphINE PF 2 MG/ML injection 2 mg  2 mg Intravenous Q2H PRN    Or    morphINE PF 4 MG/ML injection 4 mg  4 mg Intravenous Q2H PRN    ondansetron (Zofran) 4 MG/2ML injection 4 mg  4 mg Intravenous Q6H PRN    dextrose 5%-sodium chloride 0.45% infusion   Intravenous Continuous    phenol (Chloraseptic) 1.4 % oral liquid spray   Oral Q2H PRN         Assessment  Patient Active Problem List   Diagnosis    Shoulder pain    Shoulder fracture, right    Shoulder arthritis    SBO (small bowel obstruction) (HCC)    PAF (paroxysmal atrial fibrillation) (HCC)    CREST (calcinosis, Raynaud's phenomenon, esophageal dysfunction, sclerodactyly, telangiectasia) (HCC)    Hypoxia    Iron deficiency anemia    Anemia    Small bowel obstruction (HCC)       Plan:     Partial SBO  - seen by surgery  - placed NGT to LIS, cont for today  - IVFs  - IV zofran prn nausea    Urinary frequency  - check u/a, hold abx for now    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  Afib  HL  Hypothyroidism     Prophylaxis:   DVT with scds    Dispo: pending clinical course    Global A/P  - reviewed labs and vitals from today  - reviewed notes of the day  - cbc, bmp, mag ordered for tomorrow  - discussed need to stay in the hospital today due to above  - cont IV fluids  - discussed with patient/RN and care team    MDM: High complexity- severe exacerbation of chronic illness posing a threat to life. IV meds requiring close inpatient monitoring. I personally spent time on chart/note review, review of labs/imaging, discussion with patient, physical exam, discussion with staff, writing progress note, and discussion of plan of care.      Germania Randall MD  11/20/2024  2:48 PM    **Certification      PHYSICIAN Certification of Need for Inpatient Hospitalization - Initial Certification    Patient will require inpatient services that will reasonably be expected to span two midnight's based on the clinical documentation in H+P.   Based on patients current state of illness, I anticipate that, after discharge, patient will require TBD.

## 2024-11-21 ENCOUNTER — APPOINTMENT (OUTPATIENT)
Dept: GENERAL RADIOLOGY | Facility: HOSPITAL | Age: 84
End: 2024-11-21
Payer: MEDICARE

## 2024-11-21 ENCOUNTER — APPOINTMENT (OUTPATIENT)
Dept: GENERAL RADIOLOGY | Facility: HOSPITAL | Age: 84
DRG: 389 | End: 2024-11-21
Payer: MEDICARE

## 2024-11-21 LAB
ANION GAP SERPL CALC-SCNC: 5 MMOL/L (ref 0–18)
BASOPHILS # BLD AUTO: 0.03 X10(3) UL (ref 0–0.2)
BASOPHILS NFR BLD AUTO: 0.5 %
BUN BLD-MCNC: 17 MG/DL (ref 9–23)
BUN/CREAT SERPL: 17 (ref 10–20)
CALCIUM BLD-MCNC: 9.4 MG/DL (ref 8.7–10.4)
CHLORIDE SERPL-SCNC: 105 MMOL/L (ref 98–112)
CO2 SERPL-SCNC: 30 MMOL/L (ref 21–32)
CREAT BLD-MCNC: 1 MG/DL
DEPRECATED RDW RBC AUTO: 46.1 FL (ref 35.1–46.3)
EGFRCR SERPLBLD CKD-EPI 2021: 56 ML/MIN/1.73M2 (ref 60–?)
EOSINOPHIL # BLD AUTO: 0.07 X10(3) UL (ref 0–0.7)
EOSINOPHIL NFR BLD AUTO: 1.2 %
ERYTHROCYTE [DISTWIDTH] IN BLOOD BY AUTOMATED COUNT: 13.4 % (ref 11–15)
GLUCOSE BLD-MCNC: 92 MG/DL (ref 70–99)
GLUCOSE BLDC GLUCOMTR-MCNC: 88 MG/DL (ref 70–99)
HCT VFR BLD AUTO: 33.6 %
HGB BLD-MCNC: 11.3 G/DL
IMM GRANULOCYTES # BLD AUTO: 0.02 X10(3) UL (ref 0–1)
IMM GRANULOCYTES NFR BLD: 0.3 %
LYMPHOCYTES # BLD AUTO: 0.97 X10(3) UL (ref 1–4)
LYMPHOCYTES NFR BLD AUTO: 16.1 %
MAGNESIUM SERPL-MCNC: 2.1 MG/DL (ref 1.6–2.6)
MCH RBC QN AUTO: 31.5 PG (ref 26–34)
MCHC RBC AUTO-ENTMCNC: 33.6 G/DL (ref 31–37)
MCV RBC AUTO: 93.6 FL
MONOCYTES # BLD AUTO: 0.55 X10(3) UL (ref 0.1–1)
MONOCYTES NFR BLD AUTO: 9.1 %
NEUTROPHILS # BLD AUTO: 4.38 X10 (3) UL (ref 1.5–7.7)
NEUTROPHILS # BLD AUTO: 4.38 X10(3) UL (ref 1.5–7.7)
NEUTROPHILS NFR BLD AUTO: 72.8 %
OSMOLALITY SERPL CALC.SUM OF ELEC: 291 MOSM/KG (ref 275–295)
PLATELET # BLD AUTO: 187 10(3)UL (ref 150–450)
POTASSIUM SERPL-SCNC: 3.8 MMOL/L (ref 3.5–5.1)
RBC # BLD AUTO: 3.59 X10(6)UL
SODIUM SERPL-SCNC: 140 MMOL/L (ref 136–145)
WBC # BLD AUTO: 6 X10(3) UL (ref 4–11)

## 2024-11-21 PROCEDURE — 99233 SBSQ HOSP IP/OBS HIGH 50: CPT | Performed by: HOSPITALIST

## 2024-11-21 PROCEDURE — 74250 X-RAY XM SM INT 1CNTRST STD: CPT

## 2024-11-21 NOTE — PROGRESS NOTES
[facility]  part of [facility]    Progress Note    Sherry Elizondo Patient Status:  Inpatient    1940 MRN [id number]   Location [facility] 4W/SW/SE Attending Germania Randall MD   Hosp Day # 2 PCP Araceli Rasheed     Chief Complaint: abd pain    SUBJECTIVE:    Pt seen during SBFT.  Feels a little tired.  Some nausea.  No vomiting.  Some very mild abd discomfort. Not passed much gas.    10 ROS completed and otherwise negative.    OBJECTIVE:  Vital signs in last 24 hours:  /54 (BP Location: Left arm)   Pulse 63   Temp 99.5 °F (37.5 °C) (Oral)   Resp 16   Wt 144 lb (65.3 kg)   SpO2 94%   BMI 25.51 kg/m²     Intake/Output:    Intake/Output Summary (Last 24 hours) at 2024 1420  Last data filed at 2024 0842  Gross per 24 hour   Intake 250 ml   Output 2090 ml   Net -1840 ml       Wt Readings from Last 3 Encounters:   24 144 lb (65.3 kg)   23 140 lb (63.5 kg)   22 160 lb (72.6 kg)       Exam     Gen: No acute distress  Pulm: Lungs clear, normal respiratory effort  CV: Heart with regular rate and rhythm  Abd: Abdomen soft, nontender, bowel sounds present  Neuro: No acute focal deficits  MSK: moves extremities  Skin: Warm and dry  Psych: Normal affect  Ext: no c/c/e    Data Review:     Labs:   Lab Results   Component Value Date    WBC 6.0 2024    HGB 11.3 2024    HCT 33.6 2024    .0 2024    CREATSERUM 1.00 2024    BUN 17 2024     2024    K 3.8 2024     2024    CO2 30.0 2024    GLU 92 2024    CA 9.4 2024    MG 2.1 2024       Imaging: Reviewed    XR ABDOMEN (1 VIEW) (CPT=74018)    Result Date: 2024  CONCLUSION:   Dilated small bowel loops left hemiabdomen measuring up to 4.3 cm likely corresponding to the partial small bowel obstruction versus small bowel ileus described on preceding CT abdomen pelvis 2024.  NG tube tip in the proximal  stomach.    Dictated by (CST): Andreas Bejarano MD on 11/20/2024 at 3:35 PM     Finalized by (CST): Andreas Bejarano MD on 11/20/2024 at 3:37 PM          XR CHEST AP PORTABLE  (CPT=71045)    Result Date: 11/19/2024  CONCLUSION:   Enteric tube tip in the proximal stomach.  The distal side hole of the enteric tube projects at the level of the gastroesophageal junction.  Consider advancement.    Dictated by (CST): Andreas Bejarano MD on 11/19/2024 at 1:25 PM     Finalized by (CST): Andreas Bejarano MD on 11/19/2024 at 1:27 PM          CT ABDOMEN+PELVIS(CPT=74176)    Result Date: 11/19/2024  CONCLUSION:   Partial small bowel obstruction versus small bowel ileus.  No discrete transition point.  Extensive fecalization of the material within the distal small bowel consistent delayed transit or stasis.  Diverticulosis without diverticulitis.  No pneumatosis or pneumoperitoneum.  Small hiatal hernia with patulous distal esophagus.  Similar findings were described on outside hospital CT performed 11/18/2024.  Images not available for direct view.     Dictated by (CST): Jesse Perez MD on 11/19/2024 at 11:17 AM     Finalized by (CST): Jesse Perez MD on 11/19/2024 at 11:23 AM           Meds:   Current Facility-Administered Medications   Medication Dose Route Frequency    amiodarone (Pacerone) tab 200 mg  200 mg Per NG Tube Daily    morphINE PF 2 MG/ML injection 1 mg  1 mg Intravenous Q2H PRN    Or    morphINE PF 2 MG/ML injection 2 mg  2 mg Intravenous Q2H PRN    Or    morphINE PF 4 MG/ML injection 4 mg  4 mg Intravenous Q2H PRN    ondansetron (Zofran) 4 MG/2ML injection 4 mg  4 mg Intravenous Q6H PRN    dextrose 5%-sodium chloride 0.45% infusion   Intravenous Continuous    phenol (Chloraseptic) 1.4 % oral liquid spray   Oral Q2H PRN         Assessment  Patient Active Problem List   Diagnosis    Shoulder pain    Shoulder fracture, right    Shoulder arthritis    SBO (small bowel obstruction) (HCC)    PAF (paroxysmal atrial  fibrillation) (HCC)    CREST (calcinosis, Raynaud's phenomenon, esophageal dysfunction, sclerodactyly, telangiectasia) (HCC)    Hypoxia    Iron deficiency anemia    Anemia    Small bowel obstruction (HCC)       Plan:     Partial SBO  - seen by surgery  - placed NGT to LIS, cont for today - await SBFT   - IVFs  - IV zofran prn nausea    Urinary frequency  - check u/a, hold abx for now    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 Afib  HL  Hypothyroidism     Prophylaxis:   DVT with scds    Dispo: pending clinical course    Global A/P  - reviewed labs and vitals from today  - reviewed notes of the day  - cbc, bmp, mag ordered for tomorrow  - discussed need to stay in the hospital today due to above  - cont IV fluids  - discussed with patient/RN and care team    MDM: High complexity- severe exacerbation of chronic illness posing a threat to life. IV meds requiring close inpatient monitoring. I personally spent time on chart/note review, review of labs/imaging, discussion with patient, physical exam, discussion with staff, writing progress note, and discussion of plan of care.      Supplementary Documentation:   DVT Mechanical Prophylaxis:   SCDs,    DVT Pharmacologic Prophylaxis   Medication   None                Code Status: Full Code  Pollock: No urinary catheter in place  Pollock Duration (in days):   Central line:    RUBENS: 11/22/2024

## 2024-11-21 NOTE — CDS QUERY
Dear Chidi,  Can clinical validity of ANA be clarified    ( ) ANA ruled out  ( x) ANA is valid, please document additional supporting indicators here: _11/9-11/21 Creatinine 1.15 0.99 1.0  GFR 48 56 57  Urine out put  11/19  1.8ml/kl hrs 11/20 - 04mg/kg 24 hrs_____________________________________________  ( ) Other- please specify:_________________________________     Clinical indicators  11/9-11/21 Creatinine 1.15 0.99 1.0  GFR 48 56 57  Urine out put  11/19  1.8ml/kl hrs 11/20 - 04mg/kg 24 hrs    H&P- progress notes:  ANA on ckd stage 2-3 - improved with IVFs     Risk factors: advance age, CREST, HTN, Afib admited for Partial small bowel obstruction   Treatment: IVF, Monitoring     If you have any questions, please contact Clinical Documentation  Specialist:  CONRAD Kaur at [phone]     Thank You!     THIS FORM IS A PERMANENT PART OF THE MEDICAL RECORD

## 2024-11-21 NOTE — PROGRESS NOTES
[facility]  Progress Note    Sherry Elizondo Patient Status:  Inpatient    1940 MRN [id number]   Location [facility] 4W/SW/SE Attending Germania Randall MD   Hosp Day # 2 PCP Araceli Rasheed     Subjective:  Patient resting in bed. She feels improvement in her chest pain, still has abdominal \"tightness and tension\". She denies overt pain. She is without nausea or vomiting, NGT connected to LIS with bilious output. She is passing scant gas but has not had a bowel movement. She is able to ambulate, has been NPO.     Objective/Physical Exam:  General: Alert, orientated x3.  Cooperative.  No apparent distress.  Vital Signs:  Blood pressure 110/51, pulse 59, temperature 97.6 °F (36.4 °C), temperature source Oral, resp. rate 18, weight 144 lb (65.3 kg), SpO2 92%.  Wt Readings from Last 3 Encounters:   24 144 lb (65.3 kg)   23 140 lb (63.5 kg)   22 160 lb (72.6 kg)     Lungs: No respiratory distress.  Cardiac: Regular rate and rhythm.   Abdomen:  Soft, mildly distended,  lower abdominal discomfort with no rebound or guarding.  No peritoneal signs.   Extremities:  No lower extremity edema noted.      Intake/Output:    Intake/Output Summary (Last 24 hours) at 2024 0934  Last data filed at 2024 0842  Gross per 24 hour   Intake 1050 ml   Output 2610 ml   Net -1560 ml     I/O last 3 completed shifts:  In: 1050 [I.V.:1050]  Out: 4460 [Urine:2040; Emesis/NG output:2420]  I/O this shift:  In: -   Out: 400 [Urine:100; Emesis/NG output:300]    Medications:    amiodarone  200 mg Per NG Tube Daily       Labs:  Lab Results   Component Value Date    WBC 6.0 2024    HGB 11.3 2024    HCT 33.6 2024    .0 2024     Lab Results   Component Value Date     2024    K 3.8 2024     2024    CO2 30.0 2024    BUN 17 2024    CREATSERUM 1.00 2024    GLU 92 2024    CA 9.4 2024     No results found for:  \"PT\", \"INR\"      Assessment  Patient Active Problem List   Diagnosis    Shoulder pain    Shoulder fracture, right    Shoulder arthritis    SBO (small bowel obstruction) (HCC)    PAF (paroxysmal atrial fibrillation) (HCC)    CREST (calcinosis, Raynaud's phenomenon, esophageal dysfunction, sclerodactyly, telangiectasia) (HCC)    Hypoxia    Iron deficiency anemia    Anemia    Small bowel obstruction (HCC)       Partial small bowel obstruction    Plan:  No acute surgical intervention indicated at this time. KUB reviewed, SBFT today.   Continue non-operative management, including NGT to LIS  NPO except sips of clears  Ambulate and up to chair  DVT prophylaxis with SCDs      Quality:  DVT Mechanical Prophylaxis:   SCDs,    DVT Pharmacologic Prophylaxis   Medication   None                Code Status: Full Code  Pollock: No urinary catheter in place  Pollock Duration (in days):   Central line:    RUBENS: 11/22/2024        Rebecca Ascencio PA-C  11/21/2024  9:34 AM

## 2024-11-21 NOTE — DIETARY NOTE
ADULT NUTRITION INITIAL ASSESSMENT    Pt is at moderate nutrition risk.  Pt meets moderate malnutrition criteria.      CRITERIA FOR MALNUTRITION DIAGNOSIS:  Criteria for non-severe malnutrition diagnosis: chronic illness related to body fat and muscle mass with moderate to severe deficits.   .    RECOMMENDATIONS TO MD: See Nutrition Intervention    ADMITTING DIAGNOSIS:  Small bowel obstruction (HCC) [K56.609]    PERTINENT PAST MEDICAL HISTORY:  has a past medical history of Arrhythmia, Back problem, Disorder of thyroid, Esophageal reflux, High blood pressure, High cholesterol, Neuropathy, Problems with swallowing, and Visual impairment.     PATIENT STATUS: Initial 11/21/24: Seeing pt due to screened at risk by RN at admission for unintentional wt loss and eating poorly due to decreased appetite. Pt admitted with a SBO--SBFT in progress (SBO vs ileus to be determined).  Tolerating clamping of NGT. Denies nausea. No BM, scant gas. Pt with scleredema. Son in room during pt visit.  Son in room stating that scleroderma playing a role in her GI status.  GI surgery by Dr Juarez in 2021. Pt does have ensure at home, but a case may last her 6 months. Urged 1-2 daily at home for nutrition support.        FOOD/NUTRITION RELATED HISTORY:  Appetite: NPO  Intake: NPO  Intake Meeting Needs:  no, but when oral diet initiated ONS to maximize po intake.      Percent Meals Eaten (last 6 days)       None           Food Allergies: No Known Food Allergies (NKFA)  Cultural/Ethnic/Judaism Preferences: Not Obtained    GASTROINTESTINAL:  NGT in place, clamped at this time and no nausea. SBFT in progress.      MEDICATIONS: reviewed   amiodarone  200 mg Per NG Tube Daily       LABS: reviewed  Recent Labs     11/19/24  1052 11/20/24  0434 11/21/24  0427   GLU 94 98 92   BUN 17 16 17   CREATSERUM 1.15* 0.99 1.00   CA 10.1 9.4 9.4   MG  --   --  2.1    139 140   K 4.9 4.0 3.8    103 105   CO2 30.0 30.0 30.0   OSMOCALC 287 289 291        NUTRITION RELATED PHYSICAL FINDINGS:  - Nutrition Focused Physical Exam (NFPE): moderate depletion body fat orbital region, triceps region, and fat overlying rib cage region and moderate depletion muscle mass temple region, clavicle region, scapular region, shoulder region, and dorsal tinajero region   - Fluid Accumulation: none  see RN documentation for details  - Skin Integrity: intact see RN documentation for details    ANTHROPOMETRICS:  HT:  5'3\"  WT: 65.3 kg (144 lb)   BMI: Body mass index is 25.51 kg/m².  BMI CLASSIFICATION: 25-29.9 kg/m2 - overweight  IBW: 115 lbs        125% IBW  Usual Body Wt: 140-145# for past couple yrs per pt and family. > 4 yrs ago as high as 190#       100% UBW    WEIGHT HISTORY:  Patient Weight(s) for the past 336 hrs:   Weight   11/19/24 1436 65.3 kg (144 lb)   11/19/24 1020 65.3 kg (144 lb)     Wt Readings from Last 10 Encounters:   11/19/24 65.3 kg (144 lb)   02/09/23 63.5 kg (140 lb)   04/07/22 72.6 kg (160 lb)   03/21/22 72.6 kg (160 lb)   09/14/21 72.6 kg (160 lb)   09/11/21 76.1 kg (167 lb 12.8 oz)   09/11/21 75.8 kg (167 lb)   08/07/20 81.3 kg (179 lb 3.2 oz)   07/29/20 81.6 kg (180 lb)   06/17/20 86.2 kg (190 lb 1.6 oz)       NUTRITION DIAGNOSIS/PROBLEM:    Moderate Malnutrition related to Chronic - Alteration in gastrointestinal tract structure and/or function  as evidenced by moderate muscle and fat deficits and limited po intake for the past few days given GI status and NPO status.      NUTRITION INTERVENTION:     NUTRITION PRESCRIPTION:   Estimated Nutrition needs: --dosing wt of 65.3 kg - wt taken on 11/19  Calories: 3576-8534 calories/day (22-24 calories per kg Dosing wt)  Protein: 76 g protein/day (1.2 g protein/kg Dosing wt)    - Diet:       Procedures    NPO      - RD Malnutrition Care Plan:  diet advancement as medically safe and ONS bid to maximize po intake. If unable to initiate/advance diet, pt may need alternate mode of nutrition support (TPN/PPN)    -  Parenteral Nutrition: none at this time.      - Meals and snacks: NPO  - Medical Food Supplements-RD added Ensure Compact (220 calories/ 9 g protein each) Vanilla BID--when diet allows. Rational/use of oral supplements discussed.  - Vitamin and mineral supplements: none  - Feeding assistance: NPO  - Nutrition education: Discussed importance of adequate energy and protein intake   - Coordination of nutrition care: collaboration with other providers  - Discharge and transfer of nutrition care to new setting or provider: to be determined    MONITOR AND EVALUATE/NUTRITION GOALS:  - Food and Nutrient Intake:      Monitor: for PO initiation and for PO diet advancement  - Food and Nutrient Administration:      Monitor: nutrition support vs diet initiation  - Anthropometric Measurement:    Monitor weight  - Nutrition Goals:      halt wt loss, PO and supplement greater than 75% of needs, labs within acceptable limits, prevent skin breakdown, and improved GI status    DIETITIAN FOLLOW UP: RD to follow and monitor nutrition status      Sylvia Serrano RD, LDN   Clinical Dietitian [id number]

## 2024-11-21 NOTE — PLAN OF CARE
Pt a/o x3. On RA. Up SBA with RW. NGT clamped this morning for the abdominal series. Voiding freely, removed this evening by Dr. Juarez. CLD started this evening. Said she passed gas \"a very tiny\" bit. Hypoactive bowel sounds to upper quadrants only.       Problem: PAIN - ADULT  Goal: Verbalizes/displays adequate comfort level or patient's stated pain goal  Description: INTERVENTIONS:  - Encourage pt to monitor pain and request assistance  - Assess pain using appropriate pain scale  - Administer analgesics based on type and severity of pain and evaluate response  - Implement non-pharmacological measures as appropriate and evaluate response  - Consider cultural and social influences on pain and pain management  - Manage/alleviate anxiety  - Utilize distraction and/or relaxation techniques  - Monitor for opioid side effects  - Notify MD/LIP if interventions unsuccessful or patient reports new pain  - Anticipate increased pain with activity and pre-medicate as appropriate  Outcome: Progressing     Problem: RISK FOR INFECTION - ADULT  Goal: Absence of fever/infection during anticipated neutropenic period  Description: INTERVENTIONS  - Monitor WBC  - Administer growth factors as ordered  - Implement neutropenic guidelines  Outcome: Progressing     Problem: SAFETY ADULT - FALL  Goal: Free from fall injury  Description: INTERVENTIONS:  - Assess pt frequently for physical needs  - Identify cognitive and physical deficits and behaviors that affect risk of falls.  - [place] fall precautions as indicated by assessment.  - Educate pt/family on patient safety including physical limitations  - Instruct pt to call for assistance with activity based on assessment  - Modify environment to reduce risk of injury  - Provide assistive devices as appropriate  - Consider OT/PT consult to assist with strengthening/mobility  - Encourage toileting schedule  Outcome: Progressing     Problem: DISCHARGE PLANNING  Goal: Discharge to home or  other facility with appropriate resources  Description: INTERVENTIONS:  - Identify barriers to discharge w/pt and caregiver  - Include patient/family/discharge partner in discharge planning  - Arrange for needed discharge resources and transportation as appropriate  - Identify discharge learning needs (meds, wound care, etc)  - Arrange for interpreters to assist at discharge as needed  - Consider post-discharge preferences of patient/family/discharge partner  - Complete POLST form as appropriate  - Assess patient's ability to be responsible for managing their own health  - Refer to Case Management Department for coordinating discharge planning if the patient needs post-hospital services based on physician/LIP order or complex needs related to functional status, cognitive ability or social support system  Outcome: Progressing     Problem: GASTROINTESTINAL - ADULT  Goal: Minimal or absence of nausea and vomiting  Description: INTERVENTIONS:  - Maintain adequate hydration with IV or PO as ordered and tolerated  - Nasogastric tube to low intermittent suction as ordered  - Evaluate effectiveness of ordered antiemetic medications  - Provide nonpharmacologic comfort measures as appropriate  - Advance diet as tolerated, if ordered  - Obtain nutritional consult as needed  - Evaluate fluid balance  Outcome: Progressing  Goal: Maintains or returns to baseline bowel function  Description: INTERVENTIONS:  - Assess bowel function  - Maintain adequate hydration with IV or PO as ordered and tolerated  - Evaluate effectiveness of GI medications  - Encourage mobilization and activity  - Obtain nutritional consult as needed  - Establish a toileting routine/schedule  - Consider collaborating with pharmacy to review patient's medication profile  Outcome: Progressing

## 2024-11-21 NOTE — PLAN OF CARE
Problem: Patient Centered Care  Goal: Patient preferences are identified and integrated in the patient's plan of care  Description: Interventions:  - What would you like us to know as we care for you?   - Provide timely, complete, and accurate information to patient/family  - Incorporate patient and family knowledge, values, beliefs, and cultural backgrounds into the planning and delivery of care  - Encourage patient/family to participate in care and decision-making at the level they choose  - Honor patient and family perspectives and choices  Outcome: Progressing     Problem: Patient/Family Goals  Goal: Patient/Family Long Term Goal  Description: Patient's Long Term Goal: to have resolved bowel obstruction.    Interventions:  - surgical consult.  - See additional Care Plan goals for specific interventions  Outcome: Progressing  Goal: Patient/Family Short Term Goal  Description: Patient's Short Term Goal: to have NG tube removed.    Interventions:   - surgical consult. Strict I/O.  - See additional Care Plan goals for specific interventions  Outcome: Progressing     Problem: PAIN - ADULT  Goal: Verbalizes/displays adequate comfort level or patient's stated pain goal  Description: INTERVENTIONS:  - Encourage pt to monitor pain and request assistance  - Assess pain using appropriate pain scale  - Administer analgesics based on type and severity of pain and evaluate response  - Implement non-pharmacological measures as appropriate and evaluate response  - Consider cultural and social influences on pain and pain management  - Manage/alleviate anxiety  - Utilize distraction and/or relaxation techniques  - Monitor for opioid side effects  - Notify MD/LIP if interventions unsuccessful or patient reports new pain  - Anticipate increased pain with activity and pre-medicate as appropriate  Outcome: Progressing     Problem: RISK FOR INFECTION - ADULT  Goal: Absence of fever/infection during anticipated neutropenic  period  Description: INTERVENTIONS  - Monitor WBC  - Administer growth factors as ordered  - Implement neutropenic guidelines  Outcome: Progressing     Problem: SAFETY ADULT - FALL  Goal: Free from fall injury  Description: INTERVENTIONS:  - Assess pt frequently for physical needs  - Identify cognitive and physical deficits and behaviors that affect risk of falls.  - [place] fall precautions as indicated by assessment.  - Educate pt/family on patient safety including physical limitations  - Instruct pt to call for assistance with activity based on assessment  - Modify environment to reduce risk of injury  - Provide assistive devices as appropriate  - Consider OT/PT consult to assist with strengthening/mobility  - Encourage toileting schedule  Outcome: Progressing     Problem: DISCHARGE PLANNING  Goal: Discharge to home or other facility with appropriate resources  Description: INTERVENTIONS:  - Identify barriers to discharge w/pt and caregiver  - Include patient/family/discharge partner in discharge planning  - Arrange for needed discharge resources and transportation as appropriate  - Identify discharge learning needs (meds, wound care, etc)  - Arrange for interpreters to assist at discharge as needed  - Consider post-discharge preferences of patient/family/discharge partner  - Complete POLST form as appropriate  - Assess patient's ability to be responsible for managing their own health  - Refer to Case Management Department for coordinating discharge planning if the patient needs post-hospital services based on physician/LIP order or complex needs related to functional status, cognitive ability or social support system  Outcome: Progressing     Problem: GASTROINTESTINAL - ADULT  Goal: Minimal or absence of nausea and vomiting  Description: INTERVENTIONS:  - Maintain adequate hydration with IV or PO as ordered and tolerated  - Nasogastric tube to low intermittent suction as ordered  - Evaluate effectiveness of  ordered antiemetic medications  - Provide nonpharmacologic comfort measures as appropriate  - Advance diet as tolerated, if ordered  - Obtain nutritional consult as needed  - Evaluate fluid balance  Outcome: Progressing  Goal: Maintains or returns to baseline bowel function  Description: INTERVENTIONS:  - Assess bowel function  - Maintain adequate hydration with IV or PO as ordered and tolerated  - Evaluate effectiveness of GI medications  - Encourage mobilization and activity  - Obtain nutritional consult as needed  - Establish a toileting routine/schedule  - Consider collaborating with pharmacy to review patient's medication profile  Outcome: Progressing

## 2024-11-22 PROCEDURE — 99233 SBSQ HOSP IP/OBS HIGH 50: CPT | Performed by: HOSPITALIST

## 2024-11-22 RX ORDER — PANTOPRAZOLE SODIUM 40 MG/1
40 TABLET, DELAYED RELEASE ORAL
Status: DISCONTINUED | OUTPATIENT
Start: 2024-11-22 | End: 2024-11-23

## 2024-11-22 NOTE — PROGRESS NOTES
[facility]  Progress Note    Sherry Elizondo Patient Status:  Inpatient    1940 MRN [id number]   Location [facility] 4W/SW/SE Attending Germania Randall MD   Hosp Day # 3 PCP Araceli Rasheed     Subjective:  Patient feels improvement in her abdominal discomfort. She is without nausea or vomiting since NGT was removed yesterday. She is passing gas and had a bowel movement yesterday. She is able to ambulate and tolerating clear liquid diet well.     Objective/Physical Exam:  General: Alert, orientated x3.  Cooperative.  No apparent distress.  Vital Signs:  Blood pressure 97/55, pulse 62, temperature 98.4 °F (36.9 °C), temperature source Oral, resp. rate 16, weight 144 lb (65.3 kg), SpO2 95%.  Wt Readings from Last 3 Encounters:   24 144 lb (65.3 kg)   23 140 lb (63.5 kg)   22 160 lb (72.6 kg)     Lungs: No respiratory distress.  Cardiac: Regular rate and rhythm.   Abdomen:  Soft, mildly distended,  lower abdominal discomfort with no rebound or guarding.  No peritoneal signs.   Extremities:  No lower extremity edema noted.      Intake/Output:    Intake/Output Summary (Last 24 hours) at 2024 0736  Last data filed at 2024 1738  Gross per 24 hour   Intake --   Output 700 ml   Net -700 ml     I/O last 3 completed shifts:  In: -   Out: 1620 [Urine:520; Emesis/NG output:1100]  No intake/output data recorded.    Medications:    amiodarone  200 mg Per NG Tube Daily       Labs:            No results found for: \"PT\", \"INR\"      Assessment  Patient Active Problem List   Diagnosis    Shoulder pain    Shoulder fracture, right    Shoulder arthritis    SBO (small bowel obstruction) ([facility])    PAF (paroxysmal atrial fibrillation) ([facility])    CREST (calcinosis, Raynaud's phenomenon, esophageal dysfunction, sclerodactyly, telangiectasia) ([facility])    Hypoxia    Iron deficiency anemia    Anemia    Small bowel obstruction (HCC)       Partial small bowel obstruction, improved      Plan:  Patient with clinical signs of return of bowel function, advance to low fiber soft diet as tolerated  Stop IV fluids  Ambulate and up to chair  DVT prophylaxis with SCDs      Quality:  DVT Mechanical Prophylaxis:   SCDs,    DVT Pharmacologic Prophylaxis   Medication   None                Code Status: Full Code  Pollock: No urinary catheter in place  Pollock Duration (in days):   Central line:    RUBENS: 11/23/2024        Rebecca Ascencio PA-C  11/22/2024  7:38 AM    Doing better overall  Had some dizziness last night, blood pressure on the lower side however improved this morning  Urine output remains excellent  Having multiple bowel movements, abdominal discomfort has improved considerably  Reviewed small bowel follow-through, contrast reaches:  Advance diet as tolerated  Will review medications with primary team, hopefully may be discharged home today    Elmer Juarez MD  Complex General Surgical Oncology  [email or web address], formerly [facility]  [email or web address]

## 2024-11-22 NOTE — PLAN OF CARE
Tolerated diet. Frequent rounding by staff. Call light within reach. Safety precautions in place.   Problem: Patient Centered Care  Goal: Patient preferences are identified and integrated in the patient's plan of care  Description: Interventions:  - What would you like us to know as we care for you?    - Provide timely, complete, and accurate information to patient/family  - Incorporate patient and family knowledge, values, beliefs, and cultural backgrounds into the planning and delivery of care  - Encourage patient/family to participate in care and decision-making at the level they choose  - Honor patient and family perspectives and choices  Outcome: Progressing     Problem: Patient/Family Goals  Goal: Patient/Family Long Term Goal  Description: Patient's Long Term Goal: to have resolved bowel obstruction.    Interventions:  - surgical consult.  - See additional Care Plan goals for specific interventions  Outcome: Progressing  Goal: Patient/Family Short Term Goal  Description: Patient's Short Term Goal: to have NG tube removed.    Interventions:   - surgical consult. Strict I/O.  - See additional Care Plan goals for specific interventions  Outcome: Progressing     Problem: PAIN - ADULT  Goal: Verbalizes/displays adequate comfort level or patient's stated pain goal  Description: INTERVENTIONS:  - Encourage pt to monitor pain and request assistance  - Assess pain using appropriate pain scale  - Administer analgesics based on type and severity of pain and evaluate response  - Implement non-pharmacological measures as appropriate and evaluate response  - Consider cultural and social influences on pain and pain management  - Manage/alleviate anxiety  - Utilize distraction and/or relaxation techniques  - Monitor for opioid side effects  - Notify MD/LIP if interventions unsuccessful or patient reports new pain  - Anticipate increased pain with activity and pre-medicate as appropriate  Outcome: Progressing     Problem:  RISK FOR INFECTION - ADULT  Goal: Absence of fever/infection during anticipated neutropenic period  Description: INTERVENTIONS  - Monitor WBC  - Administer growth factors as ordered  - Implement neutropenic guidelines  Outcome: Progressing     Problem: SAFETY ADULT - FALL  Goal: Free from fall injury  Description: INTERVENTIONS:  - Assess pt frequently for physical needs  - Identify cognitive and physical deficits and behaviors that affect risk of falls.  - [place] fall precautions as indicated by assessment.  - Educate pt/family on patient safety including physical limitations  - Instruct pt to call for assistance with activity based on assessment  - Modify environment to reduce risk of injury  - Provide assistive devices as appropriate  - Consider OT/PT consult to assist with strengthening/mobility  - Encourage toileting schedule  Outcome: Progressing     Problem: DISCHARGE PLANNING  Goal: Discharge to home or other facility with appropriate resources  Description: INTERVENTIONS:  - Identify barriers to discharge w/pt and caregiver  - Include patient/family/discharge partner in discharge planning  - Arrange for needed discharge resources and transportation as appropriate  - Identify discharge learning needs (meds, wound care, etc)  - Arrange for interpreters to assist at discharge as needed  - Consider post-discharge preferences of patient/family/discharge partner  - Complete POLST form as appropriate  - Assess patient's ability to be responsible for managing their own health  - Refer to Case Management Department for coordinating discharge planning if the patient needs post-hospital services based on physician/LIP order or complex needs related to functional status, cognitive ability or social support system  Outcome: Progressing     Problem: GASTROINTESTINAL - ADULT  Goal: Minimal or absence of nausea and vomiting  Description: INTERVENTIONS:  - Maintain adequate hydration with IV or PO as ordered and  tolerated  - Nasogastric tube to low intermittent suction as ordered  - Evaluate effectiveness of ordered antiemetic medications  - Provide nonpharmacologic comfort measures as appropriate  - Advance diet as tolerated, if ordered  - Obtain nutritional consult as needed  - Evaluate fluid balance  Outcome: Progressing  Goal: Maintains or returns to baseline bowel function  Description: INTERVENTIONS:  - Assess bowel function  - Maintain adequate hydration with IV or PO as ordered and tolerated  - Evaluate effectiveness of GI medications  - Encourage mobilization and activity  - Obtain nutritional consult as needed  - Establish a toileting routine/schedule  - Consider collaborating with pharmacy to review patient's medication profile  Outcome: Progressing

## 2024-11-22 NOTE — PROGRESS NOTES
[facility]  part of [facility]    Progress Note    Sherry Elizondo Patient Status:  Inpatient    1940 MRN [id number]   Location [facility] 4W/SW/SE Attending Germania Randall MD   Hosp Day # 3 PCP Araceli Rasheed     Chief Complaint: abd pain    SUBJECTIVE:    Feeling a little dizzy when she gets up.  No abd pain.  No nausea.  Tolerating diet.    10 ROS completed and otherwise negative.    OBJECTIVE:  Vital signs in last 24 hours:  /59 (BP Location: Left arm)   Pulse 67   Temp 98.4 °F (36.9 °C) (Oral)   Resp 18   Wt 144 lb (65.3 kg)   SpO2 95%   BMI 25.51 kg/m²     Intake/Output:    Intake/Output Summary (Last 24 hours) at 2024 1525  Last data filed at 2024 1738  Gross per 24 hour   Intake --   Output 300 ml   Net -300 ml       Wt Readings from Last 3 Encounters:   24 144 lb (65.3 kg)   23 140 lb (63.5 kg)   22 160 lb (72.6 kg)       Exam     Gen: No acute distress  Pulm: Lungs clear, normal respiratory effort  CV: Heart with regular rate and rhythm  Abd: Abdomen soft, nontender, bowel sounds present  Neuro: No acute focal deficits  MSK: moves extremities  Skin: Warm and dry  Psych: Normal affect  Ext: no c/c/e    Data Review:     Labs:          Imaging: Reviewed    XR SMALL BOWEL SINGLE CONTRAST (CPT=74250)    Result Date: 2024  CONCLUSION:  1. Moderate dilatation of proximal and mid small bowel loops with more decompressed distal small bowel, and findings are compatible with a partial proximal to mid small bowel obstruction.    Dictated by (CST): Rodrigo Gonzáles MD on 2024 at 4:47 PM     Finalized by (CST): Rodrigo Gonzáles MD on 2024 at 5:00 PM          XR ABDOMEN (1 VIEW) (CPT=74018)    Result Date: 2024  CONCLUSION:   Dilated small bowel loops left hemiabdomen measuring up to 4.3 cm likely corresponding to the partial small bowel obstruction versus small bowel ileus described on preceding CT abdomen pelvis  11/19/2024.  NG tube tip in the proximal stomach.    Dictated by (CST): Andreas Bejarano MD on 11/20/2024 at 3:35 PM     Finalized by (CST): Andreas Bejarano MD on 11/20/2024 at 3:37 PM           Meds:   Current Facility-Administered Medications   Medication Dose Route Frequency    apixaban (Eliquis) tab 5 mg  5 mg Oral BID    [START ON 11/23/2024] levothyroxine (Synthroid) tab 125 mcg  125 mcg Oral Before breakfast    pantoprazole (Protonix) DR tab 40 mg  40 mg Oral BID AC    amiodarone (Pacerone) tab 200 mg  200 mg Per NG Tube Daily    morphINE PF 2 MG/ML injection 1 mg  1 mg Intravenous Q2H PRN    Or    morphINE PF 2 MG/ML injection 2 mg  2 mg Intravenous Q2H PRN    Or    morphINE PF 4 MG/ML injection 4 mg  4 mg Intravenous Q2H PRN    ondansetron (Zofran) 4 MG/2ML injection 4 mg  4 mg Intravenous Q6H PRN    phenol (Chloraseptic) 1.4 % oral liquid spray   Oral Q2H PRN         Assessment  Patient Active Problem List   Diagnosis    Shoulder pain    Shoulder fracture, right    Shoulder arthritis    SBO (small bowel obstruction) (HCC)    PAF (paroxysmal atrial fibrillation) (HCC)    CREST (calcinosis, Raynaud's phenomenon, esophageal dysfunction, sclerodactyly, telangiectasia) (HCC)    Hypoxia    Iron deficiency anemia    Anemia    Small bowel obstruction (HCC)       Plan:     Partial SBO  - seen by surgery  - placed NGT to LIS, cont for today - await SBFT   - IVFs  - IV zofran prn nausea    Urinary frequency  - check u/a, hold abx for now    ANA on ckd stage 2-3  - improved with IVFs  - monitor    HTN  - hold valsartan and torsemide for now    Other PMH  CREST  P. Afib - cont amio and eliquis  HL  Hypothyroidism     Prophylaxis:   DVT with scds    Dispo: pending clinical course    Global A/P  - reviewed labs and vitals from today  - reviewed notes of the day  - cbc, bmp, mag ordered for tomorrow  - discussed need to stay in the hospital today due to above  - cont IV fluids  - discussed with patient/RN and care  team    MDM: High complexity- severe exacerbation of chronic illness posing a threat to life. IV meds requiring close inpatient monitoring. I personally spent time on chart/note review, review of labs/imaging, discussion with patient, physical exam, discussion with staff, writing progress note, and discussion of plan of care.          Greater than 55 minutes spent, Greater than 50% spent counseling and in coordination of care, reviewing labs, discussing results with patient, coordinating care with care team and ordering labs for tomorrow and adjusting medications as needed      Supplementary Documentation:   DVT Mechanical Prophylaxis:   SCDs,    DVT Pharmacologic Prophylaxis   Medication    apixaban (Eliquis) tab 5 mg                Code Status: Full Code  Pollock: No urinary catheter in place  Pollock Duration (in days):   Central line:    RUBENS: 11/23/2024              Dietitian Malnutrition Assessment    Evaluation for Malnutrition: Criteria for non-severe malnutrition diagnosis-         chronic illness related to           RD Malnutrition Care Plan:      Body Fat/Muscle Mass: Moderate depletion body fat., Moderate depletion muscle mass. orbital region., triceps region., fat overlying rib cage region. temple region., clavicle region., scapular region., shoulder region., dorsal tinajero region.    Physician Assessment     Patient has a diagnosis of moderate malnutrition

## 2024-11-22 NOTE — PHYSICAL THERAPY NOTE
PHYSICAL THERAPY TREATMENT NOTE - INPATIENT     Room Number: 412/412-A       Presenting Problem: partial SBO with NG tube       Problem List  Principal Problem:    Small bowel obstruction (HCC)  Active Problems:    Anemia      PHYSICAL THERAPY ASSESSMENT   Patient demonstrates limited progress this session, goals  remain in progress.      Patient is requiring stand-by assist as a result of the following impairments: decreased functional strength, decreased endurance/aerobic capacity, decreased muscular endurance, and increased dizziness .     Patient continues to function below baseline with bed mobility, transfers, gait, stair negotiation, and standing prolonged periods.  Next session anticipate patient to progress bed mobility, transfers, gait, stair negotiation, and standing prolonged periods.  Physical Therapy will continue to follow patient for duration of hospitalization.    Patient continues to benefit from continued skilled PT services: at discharge to promote prior level of function and safety with additional support and return home with home health PT.    PLAN DURING HOSPITALIZATION  Nursing Mobility Recommendation : 1 Assist     PT Treatment Plan: Body mechanics;Bed mobility;Coordination;Endurance;Energy conservation;Patient education;Gait training;Strengthening;Stair training;Transfer training;Balance training;Stoop training  Frequency (Obs): 5x/week     SUBJECTIVE  Pt was agreeable to therapy session.         OBJECTIVE       WEIGHT BEARING RESTRICTION       PAIN ASSESSMENT   Rating: Unable to rate     Management Techniques: Activity promotion;Body mechanics;Relaxation;Repositioning    BALANCE  Static Sitting: Good  Dynamic Sitting: Fair +  Static Standing: Fair  Dynamic Standing: Fair    ACTIVITY TOLERANCE           BP: 111/59  BP Location: Left arm  BP Method: Automatic  Patient Position: Sitting     O2 WALK       AM-PAC '6-Clicks' INPATIENT SHORT FORM - BASIC MOBILITY  How much difficulty does the  patient currently have...  Patient Difficulty: Turning over in bed (including adjusting bedclothes, sheets and blankets)?: A Little   Patient Difficulty: Sitting down on and standing up from a chair with arms (e.g., wheelchair, bedside commode, etc.): A Little   Patient Difficulty: Moving from lying on back to sitting on the side of the bed?: A Little   How much help from another person does the patient currently need...   Help from Another: Moving to and from a bed to a chair (including a wheelchair)?: A Little   Help from Another: Need to walk in hospital room?: A Little   Help from Another: Climbing 3-5 steps with a railing?: A Little     AM-PAC Score:  Raw Score: 18   Approx Degree of Impairment: 46.58%   Standardized Score (AM-PAC Scale): 43.63   CMS Modifier (G-Code): CK    FUNCTIONAL ABILITY STATUS  Functional Mobility/Gait Assessment  Gait Assistance: Supervision  Distance (ft): 25'  Assistive Device: Rolling walker  Pattern: Shuffle  Rolling:  NT  Supine to Sit:  NT  Sit to Supine:  NT  Sit to Stand: supervision    Skilled Therapy Provided: Pt is received in the bathroom and was cleared for therapy session. Pt is IND with all her bathroom needs. Pt also has been up ad vikram in her room IND. Pt is SBA with sit<>stand transfers with the RW. Pt reported when AMB out of the bathroom that she was having increased dizziness. Pt AMB about 25' with the WR from the bathroom to the chair. Did not AMB further due to pt's increased dizziness. Pt is left in the chair with all needs within reach. BP taken and was 11/59. Reported to the RN on the status of the pt.     The patient's Approx Degree of Impairment: 46.58% has been calculated based on documentation in the [facility] '6 clicks' Inpatient Daily Activity Short Form.  Research supports that patients with this level of impairment may benefit from Home with HHC and assist.  Final disposition will be made by interdisciplinary medical team.        Patient End of Session: Up  in chair;Needs met;Call light within reach;RN aware of session/findings;All patient questions and concerns addressed;Hospital anti-slip socks    CURRENT GOALS   Goals to be met by: 11/27  Patient Goal Patient's self-stated goal is: home   Goal #1 Patient is able to demonstrate supine - sit EOB @ level: supervision     Goal #1   Current Status NT received in the bathroom   Goal #2 Patient is able to demonstrate transfers Sit to/from Stand at assistance level: supervision with walker - rolling     Goal #2  Current Status SBA with the RW    Goal #3 Patient is able to ambulate 300 feet with assist device: walker - rolling at assistance level: supervision   Goal #3   Current Status 25' with the RW SBA    Goal #4 Patient will negotiate 3 stairs/one curb w/ assistive device and supervision   Goal #4   Current Status NT   Goal #5 Patient to demonstrate independence with home activity/exercise instructions provided to patient in preparation for discharge.   Goal #5   Current Status IN PROGRESS   Goal #6    Goal #6  Current Status        Therapeutic Activity: 15 minutes  N

## 2024-11-22 NOTE — PLAN OF CARE
Patient is A&Ox4. VSS. RA. NG tube removed yesterday. Patient advanced to general diet today, she is tolerating meals. Patient is continent, has had several loose bowel movements today. She is up SBA with walker. Plan is for discharge tomorrow.   Problem: Patient Centered Care  Goal: Patient preferences are identified and integrated in the patient's plan of care  Description: Interventions:  - What would you like us to know as we care for you?   - Provide timely, complete, and accurate information to patient/family  - Incorporate patient and family knowledge, values, beliefs, and cultural backgrounds into the planning and delivery of care  - Encourage patient/family to participate in care and decision-making at the level they choose  - Honor patient and family perspectives and choices  Outcome: Progressing     Problem: Patient/Family Goals  Goal: Patient/Family Long Term Goal  Description: Patient's Long Term Goal: to have resolved bowel obstruction.    Interventions:  - surgical consult.  - See additional Care Plan goals for specific interventions  Outcome: Progressing  Goal: Patient/Family Short Term Goal  Description: Patient's Short Term Goal: to have NG tube removed.    Interventions:   - surgical consult. Strict I/O.  - See additional Care Plan goals for specific interventions  Outcome: Progressing     Problem: PAIN - ADULT  Goal: Verbalizes/displays adequate comfort level or patient's stated pain goal  Description: INTERVENTIONS:  - Encourage pt to monitor pain and request assistance  - Assess pain using appropriate pain scale  - Administer analgesics based on type and severity of pain and evaluate response  - Implement non-pharmacological measures as appropriate and evaluate response  - Consider cultural and social influences on pain and pain management  - Manage/alleviate anxiety  - Utilize distraction and/or relaxation techniques  - Monitor for opioid side effects  - Notify MD/LIP if interventions  unsuccessful or patient reports new pain  - Anticipate increased pain with activity and pre-medicate as appropriate  Outcome: Progressing     Problem: RISK FOR INFECTION - ADULT  Goal: Absence of fever/infection during anticipated neutropenic period  Description: INTERVENTIONS  - Monitor WBC  - Administer growth factors as ordered  - Implement neutropenic guidelines  Outcome: Progressing     Problem: SAFETY ADULT - FALL  Goal: Free from fall injury  Description: INTERVENTIONS:  - Assess pt frequently for physical needs  - Identify cognitive and physical deficits and behaviors that affect risk of falls.  - [place] fall precautions as indicated by assessment.  - Educate pt/family on patient safety including physical limitations  - Instruct pt to call for assistance with activity based on assessment  - Modify environment to reduce risk of injury  - Provide assistive devices as appropriate  - Consider OT/PT consult to assist with strengthening/mobility  - Encourage toileting schedule  Outcome: Progressing     Problem: DISCHARGE PLANNING  Goal: Discharge to home or other facility with appropriate resources  Description: INTERVENTIONS:  - Identify barriers to discharge w/pt and caregiver  - Include patient/family/discharge partner in discharge planning  - Arrange for needed discharge resources and transportation as appropriate  - Identify discharge learning needs (meds, wound care, etc)  - Arrange for interpreters to assist at discharge as needed  - Consider post-discharge preferences of patient/family/discharge partner  - Complete POLST form as appropriate  - Assess patient's ability to be responsible for managing their own health  - Refer to Case Management Department for coordinating discharge planning if the patient needs post-hospital services based on physician/LIP order or complex needs related to functional status, cognitive ability or social support system  Outcome: Progressing     Problem: GASTROINTESTINAL -  ADULT  Goal: Minimal or absence of nausea and vomiting  Description: INTERVENTIONS:  - Maintain adequate hydration with IV or PO as ordered and tolerated  - Nasogastric tube to low intermittent suction as ordered  - Evaluate effectiveness of ordered antiemetic medications  - Provide nonpharmacologic comfort measures as appropriate  - Advance diet as tolerated, if ordered  - Obtain nutritional consult as needed  - Evaluate fluid balance  Outcome: Progressing  Goal: Maintains or returns to baseline bowel function  Description: INTERVENTIONS:  - Assess bowel function  - Maintain adequate hydration with IV or PO as ordered and tolerated  - Evaluate effectiveness of GI medications  - Encourage mobilization and activity  - Obtain nutritional consult as needed  - Establish a toileting routine/schedule  - Consider collaborating with pharmacy to review patient's medication profile  Outcome: Progressing

## 2024-11-23 VITALS
TEMPERATURE: 99 F | WEIGHT: 144 LBS | BODY MASS INDEX: 26 KG/M2 | SYSTOLIC BLOOD PRESSURE: 136 MMHG | OXYGEN SATURATION: 96 % | HEART RATE: 56 BPM | DIASTOLIC BLOOD PRESSURE: 65 MMHG | RESPIRATION RATE: 18 BRPM

## 2024-11-23 LAB
ANION GAP SERPL CALC-SCNC: 6 MMOL/L (ref 0–18)
BASOPHILS # BLD AUTO: 0.02 X10(3) UL (ref 0–0.2)
BASOPHILS NFR BLD AUTO: 0.5 %
BUN BLD-MCNC: 20 MG/DL (ref 9–23)
BUN/CREAT SERPL: 21.7 (ref 10–20)
CALCIUM BLD-MCNC: 9.6 MG/DL (ref 8.7–10.4)
CHLORIDE SERPL-SCNC: 103 MMOL/L (ref 98–112)
CO2 SERPL-SCNC: 31 MMOL/L (ref 21–32)
CREAT BLD-MCNC: 0.92 MG/DL
DEPRECATED RDW RBC AUTO: 46.3 FL (ref 35.1–46.3)
EGFRCR SERPLBLD CKD-EPI 2021: 62 ML/MIN/1.73M2 (ref 60–?)
EOSINOPHIL # BLD AUTO: 0.13 X10(3) UL (ref 0–0.7)
EOSINOPHIL NFR BLD AUTO: 2.9 %
ERYTHROCYTE [DISTWIDTH] IN BLOOD BY AUTOMATED COUNT: 13.6 % (ref 11–15)
GLUCOSE BLD-MCNC: 98 MG/DL (ref 70–99)
HCT VFR BLD AUTO: 29 %
HGB BLD-MCNC: 9.9 G/DL
IMM GRANULOCYTES # BLD AUTO: 0.01 X10(3) UL (ref 0–1)
IMM GRANULOCYTES NFR BLD: 0.2 %
LYMPHOCYTES # BLD AUTO: 0.75 X10(3) UL (ref 1–4)
LYMPHOCYTES NFR BLD AUTO: 16.9 %
MAGNESIUM SERPL-MCNC: 2.1 MG/DL (ref 1.6–2.6)
MCH RBC QN AUTO: 31.7 PG (ref 26–34)
MCHC RBC AUTO-ENTMCNC: 34.1 G/DL (ref 31–37)
MCV RBC AUTO: 92.9 FL
MONOCYTES # BLD AUTO: 0.4 X10(3) UL (ref 0.1–1)
MONOCYTES NFR BLD AUTO: 9 %
NEUTROPHILS # BLD AUTO: 3.12 X10 (3) UL (ref 1.5–7.7)
NEUTROPHILS # BLD AUTO: 3.12 X10(3) UL (ref 1.5–7.7)
NEUTROPHILS NFR BLD AUTO: 70.5 %
OSMOLALITY SERPL CALC.SUM OF ELEC: 293 MOSM/KG (ref 275–295)
PLATELET # BLD AUTO: 172 10(3)UL (ref 150–450)
POTASSIUM SERPL-SCNC: 4.3 MMOL/L (ref 3.5–5.1)
RBC # BLD AUTO: 3.12 X10(6)UL
SODIUM SERPL-SCNC: 140 MMOL/L (ref 136–145)
WBC # BLD AUTO: 4.4 X10(3) UL (ref 4–11)

## 2024-11-23 NOTE — DISCHARGE SUMMARY
[facility]  part of [facility]    Discharge Summary    Sherry Elizondo Patient Status:  Inpatient    1940 MRN [id number]   Location [facility] 4W/SW/SE Attending Germania Randall MD   Hosp Day # 4 PCP Araceli Rasheed     Date of Admission: 2024      Date of Discharge: No discharge date for patient encounter.  ***    Admitting Diagnosis: Small bowel obstruction (HCC) [K56.609]    Hospital Discharge Diagnoses: {Enter hospital discharge diagnoses here (please select the wildcards and then replace with free text; this allows us to save this data discretely for reporting purposes:5961::\"***\"}    Lace+ Score: 65  59-90 High Risk  29-58 Medium Risk  0-28   Low Risk.    TCM Follow-Up Recommendation:  {Care Managers will evaluate the need for follow-up for all patients ages 50+, and high/moderate risk patients ages 25-49. Low risk patients (LACE < 29) will only be evaluated if the \"Still recommend for TCM follow-up\" option is selected from this list.:[id number]}          Problem List:   Patient Active Problem List   Diagnosis    Shoulder pain    Shoulder fracture, right    Shoulder arthritis    SBO (small bowel obstruction) (HCC)    PAF (paroxysmal atrial fibrillation) (HCC)    CREST (calcinosis, Raynaud's phenomenon, esophageal dysfunction, sclerodactyly, telangiectasia) (HCC)    Hypoxia    Iron deficiency anemia    Anemia    Small bowel obstruction (HCC)         Physical Exam: ***    History of Present Illness: ***    Hospital Course: ***    Discharge Condition: {DISCHARGE CONDITION:}    Discharge Medications:      Discharge Medications        CONTINUE taking these medications        Instructions Prescription details   acetaminophen 500 MG Tabs  Commonly known as: Tylenol Extra Strength      Take 2 tablets (1,000 mg total) by mouth every 6 (six) hours as needed.   Refills: 0     amiodarone 200 MG Tabs  Commonly known as: Pacerone      Take 1 tablet (200 mg total) by mouth 2 (two)  times daily.   Refills: 0     apixaban 5 MG Tabs  Commonly known as: Eliquis      Take 1 tablet (5 mg total) by mouth 2 (two) times daily.   Refills: 0     cyanocobalamin 1000 MCG/ML Soln  Commonly known as: Vitamin B12      Inject 1 mL (1,000 mcg total) into the muscle every 30 (thirty) days.   Quantity: 3 mL  Refills: 3     famotidine 20 MG Tabs  Commonly known as: Pepcid      Take 1 tablet (20 mg total) by mouth daily as needed for Heartburn.   Refills: 0     levothyroxine 125 MCG Tabs  Commonly known as: Synthroid      Take 1 tablet (125 mcg total) by mouth before breakfast.   Refills: 0     pantoprazole 40 MG Tbec  Commonly known as: Protonix      Take 1 tablet (40 mg total) by mouth 2 (two) times daily before meals. 30 minutes before breakfast and dinner.   Quantity: 60 tablet  Refills: 5     torsemide 20 MG Tabs  Commonly known as: Demadex      Take 1 tablet (20 mg total) by mouth 2 (two) times daily.   Quantity: 60 tablet  Refills: 0     valsartan 80 MG Tabs  Commonly known as: Diovan      Take 1 tablet (80 mg total) by mouth 2 (two) times daily.   Refills: 0     Zolpidem Tartrate ER 12.5 MG Tbcr  Commonly known as: AMBIEN CR      Take 1 tablet (12.5 mg total) by mouth nightly as needed (insomnia).   Refills: 0                  Germania Randall MD  11/23/2024  10:05 AM    Greater than 30 minutes spent on preparation and coordination of this discharge

## 2024-11-23 NOTE — PLAN OF CARE
Alert and oriented x4 on room air. Denies N/V. Continues to have loose Bms. SCDs and eliquis for DVT prophylaxis. Up standby with walker. Call light within reach. Plan to discharge home.    Problem: Patient Centered Care  Goal: Patient preferences are identified and integrated in the patient's plan of care  Description: Interventions:  - What would you like us to know as we care for you?   - Provide timely, complete, and accurate information to patient/family  - Incorporate patient and family knowledge, values, beliefs, and cultural backgrounds into the planning and delivery of care  - Encourage patient/family to participate in care and decision-making at the level they choose  - Honor patient and family perspectives and choices  Outcome: Progressing     Problem: Patient/Family Goals  Goal: Patient/Family Long Term Goal  Description: Patient's Long Term Goal: to have resolved bowel obstruction.    Interventions:  - surgical consult.  - See additional Care Plan goals for specific interventions  Outcome: Progressing  Goal: Patient/Family Short Term Goal  Description: Patient's Short Term Goal: to have NG tube removed.    Interventions:   - surgical consult. Strict I/O.  - See additional Care Plan goals for specific interventions  Outcome: Progressing     Problem: PAIN - ADULT  Goal: Verbalizes/displays adequate comfort level or patient's stated pain goal  Description: INTERVENTIONS:  - Encourage pt to monitor pain and request assistance  - Assess pain using appropriate pain scale  - Administer analgesics based on type and severity of pain and evaluate response  - Implement non-pharmacological measures as appropriate and evaluate response  - Consider cultural and social influences on pain and pain management  - Manage/alleviate anxiety  - Utilize distraction and/or relaxation techniques  - Monitor for opioid side effects  - Notify MD/LIP if interventions unsuccessful or patient reports new pain  - Anticipate increased  pain with activity and pre-medicate as appropriate  Outcome: Progressing     Problem: RISK FOR INFECTION - ADULT  Goal: Absence of fever/infection during anticipated neutropenic period  Description: INTERVENTIONS  - Monitor WBC  - Administer growth factors as ordered  - Implement neutropenic guidelines  Outcome: Progressing     Problem: SAFETY ADULT - FALL  Goal: Free from fall injury  Description: INTERVENTIONS:  - Assess pt frequently for physical needs  - Identify cognitive and physical deficits and behaviors that affect risk of falls.  - [place] fall precautions as indicated by assessment.  - Educate pt/family on patient safety including physical limitations  - Instruct pt to call for assistance with activity based on assessment  - Modify environment to reduce risk of injury  - Provide assistive devices as appropriate  - Consider OT/PT consult to assist with strengthening/mobility  - Encourage toileting schedule  Outcome: Progressing     Problem: DISCHARGE PLANNING  Goal: Discharge to home or other facility with appropriate resources  Description: INTERVENTIONS:  - Identify barriers to discharge w/pt and caregiver  - Include patient/family/discharge partner in discharge planning  - Arrange for needed discharge resources and transportation as appropriate  - Identify discharge learning needs (meds, wound care, etc)  - Arrange for interpreters to assist at discharge as needed  - Consider post-discharge preferences of patient/family/discharge partner  - Complete POLST form as appropriate  - Assess patient's ability to be responsible for managing their own health  - Refer to Case Management Department for coordinating discharge planning if the patient needs post-hospital services based on physician/LIP order or complex needs related to functional status, cognitive ability or social support system  Outcome: Progressing     Problem: GASTROINTESTINAL - ADULT  Goal: Minimal or absence of nausea and  vomiting  Description: INTERVENTIONS:  - Maintain adequate hydration with IV or PO as ordered and tolerated  - Nasogastric tube to low intermittent suction as ordered  - Evaluate effectiveness of ordered antiemetic medications  - Provide nonpharmacologic comfort measures as appropriate  - Advance diet as tolerated, if ordered  - Obtain nutritional consult as needed  - Evaluate fluid balance  Outcome: Progressing  Goal: Maintains or returns to baseline bowel function  Description: INTERVENTIONS:  - Assess bowel function  - Maintain adequate hydration with IV or PO as ordered and tolerated  - Evaluate effectiveness of GI medications  - Encourage mobilization and activity  - Obtain nutritional consult as needed  - Establish a toileting routine/schedule  - Consider collaborating with pharmacy to review patient's medication profile  Outcome: Progressing

## 2024-11-23 NOTE — PROGRESS NOTES
[facility]  Progress Note    Sherry Elizondo Patient Status:  Inpatient    1940 MRN [id number]   Location [facility] 4W/SW/SE Attending Germania Randall MD   Hosp Day # 4 PCP Araceli Rasheed     Subjective:  Patient has persistent abdominal discomfort. She has some dizziness at rest unchanged from yesterday. She denies chest pain, SOB, or vision changes. She denies fever or chills. She had some nausea today without vomiting, denies needing medication for nausea. She is passing gas and had multiple bowel movements yesterday after contrast was administered for imaging. She is able to ambulate and tolerating soft diet well.     Objective/Physical Exam:  General: Alert, orientated x3.  Cooperative.  No apparent distress.  Vital Signs:  Blood pressure 136/65, pulse 56, temperature 98.5 °F (36.9 °C), temperature source Oral, resp. rate 18, weight 144 lb (65.3 kg), SpO2 96%.  Wt Readings from Last 3 Encounters:   24 144 lb (65.3 kg)   23 140 lb (63.5 kg)   22 160 lb (72.6 kg)     Lungs: No respiratory distress.  Cardiac: Regular rate and rhythm.   Abdomen:  Soft, mildly distended,  abdominal discomfort with no rebound or guarding.  No peritoneal signs.   Extremities:  No lower extremity edema noted.      Intake/Output:  No intake or output data in the 24 hours ending 24 0838    No intake/output data recorded.  No intake/output data recorded.    Medications:    apixaban  5 mg Oral BID    levothyroxine  125 mcg Oral Before breakfast    pantoprazole  40 mg Oral BID AC    amiodarone  200 mg Per NG Tube Daily       Labs:            No results found for: \"PT\", \"INR\"      Assessment  Patient Active Problem List   Diagnosis    Shoulder pain    Shoulder fracture, right    Shoulder arthritis    SBO (small bowel obstruction) ([facility])    PAF (paroxysmal atrial fibrillation) ([facility])    CREST (calcinosis, Raynaud's phenomenon, esophageal dysfunction, sclerodactyly, telangiectasia) ([facility])     Hypoxia    Iron deficiency anemia    Anemia    Small bowel obstruction (HCC)       Partial small bowel obstruction, improved     Plan:  Continue soft diet as tolerated  BP continues to improve. Anticipate discharge sometime today once cleared by team  Ambulate and up to chair  DVT prophylaxis with Eliquis       Quality:  DVT Mechanical Prophylaxis:   SCDs,    DVT Pharmacologic Prophylaxis   Medication    apixaban (Eliquis) tab 5 mg                Code Status: Full Code  Pollock: No urinary catheter in place  Pollock Duration (in days):   Central line:    RUBENS: 11/23/2024        Rebecca Ascencio PA-C  11/23/2024  8:39 AM

## 2024-11-23 NOTE — PLAN OF CARE
Patient is A&Ox4. VSS. RA. Patient verbalized understanding of discharge instructions prior to going home. IV removed before patient left.   Problem: Patient Centered Care  Goal: Patient preferences are identified and integrated in the patient's plan of care  Description: Interventions:  - What would you like us to know as we care for you?   - Provide timely, complete, and accurate information to patient/family  - Incorporate patient and family knowledge, values, beliefs, and cultural backgrounds into the planning and delivery of care  - Encourage patient/family to participate in care and decision-making at the level they choose  - Honor patient and family perspectives and choices  Outcome: Adequate for Discharge     Problem: Patient/Family Goals  Goal: Patient/Family Long Term Goal  Description: Patient's Long Term Goal: to have resolved bowel obstruction.    Interventions:  - surgical consult.  - See additional Care Plan goals for specific interventions  Outcome: Adequate for Discharge  Goal: Patient/Family Short Term Goal  Description: Patient's Short Term Goal: to have NG tube removed.    Interventions:   - surgical consult. Strict I/O.  - See additional Care Plan goals for specific interventions  Outcome: Adequate for Discharge     Problem: PAIN - ADULT  Goal: Verbalizes/displays adequate comfort level or patient's stated pain goal  Description: INTERVENTIONS:  - Encourage pt to monitor pain and request assistance  - Assess pain using appropriate pain scale  - Administer analgesics based on type and severity of pain and evaluate response  - Implement non-pharmacological measures as appropriate and evaluate response  - Consider cultural and social influences on pain and pain management  - Manage/alleviate anxiety  - Utilize distraction and/or relaxation techniques  - Monitor for opioid side effects  - Notify MD/LIP if interventions unsuccessful or patient reports new pain  - Anticipate increased pain with  activity and pre-medicate as appropriate  Outcome: Adequate for Discharge     Problem: RISK FOR INFECTION - ADULT  Goal: Absence of fever/infection during anticipated neutropenic period  Description: INTERVENTIONS  - Monitor WBC  - Administer growth factors as ordered  - Implement neutropenic guidelines  Outcome: Adequate for Discharge     Problem: SAFETY ADULT - FALL  Goal: Free from fall injury  Description: INTERVENTIONS:  - Assess pt frequently for physical needs  - Identify cognitive and physical deficits and behaviors that affect risk of falls.  - [place] fall precautions as indicated by assessment.  - Educate pt/family on patient safety including physical limitations  - Instruct pt to call for assistance with activity based on assessment  - Modify environment to reduce risk of injury  - Provide assistive devices as appropriate  - Consider OT/PT consult to assist with strengthening/mobility  - Encourage toileting schedule  Outcome: Adequate for Discharge     Problem: DISCHARGE PLANNING  Goal: Discharge to home or other facility with appropriate resources  Description: INTERVENTIONS:  - Identify barriers to discharge w/pt and caregiver  - Include patient/family/discharge partner in discharge planning  - Arrange for needed discharge resources and transportation as appropriate  - Identify discharge learning needs (meds, wound care, etc)  - Arrange for interpreters to assist at discharge as needed  - Consider post-discharge preferences of patient/family/discharge partner  - Complete POLST form as appropriate  - Assess patient's ability to be responsible for managing their own health  - Refer to Case Management Department for coordinating discharge planning if the patient needs post-hospital services based on physician/LIP order or complex needs related to functional status, cognitive ability or social support system  Outcome: Adequate for Discharge     Problem: GASTROINTESTINAL - ADULT  Goal: Minimal or absence  of nausea and vomiting  Description: INTERVENTIONS:  - Maintain adequate hydration with IV or PO as ordered and tolerated  - Nasogastric tube to low intermittent suction as ordered  - Evaluate effectiveness of ordered antiemetic medications  - Provide nonpharmacologic comfort measures as appropriate  - Advance diet as tolerated, if ordered  - Obtain nutritional consult as needed  - Evaluate fluid balance  Outcome: Adequate for Discharge  Goal: Maintains or returns to baseline bowel function  Description: INTERVENTIONS:  - Assess bowel function  - Maintain adequate hydration with IV or PO as ordered and tolerated  - Evaluate effectiveness of GI medications  - Encourage mobilization and activity  - Obtain nutritional consult as needed  - Establish a toileting routine/schedule  - Consider collaborating with pharmacy to review patient's medication profile  Outcome: Adequate for Discharge

## (undated) DIAGNOSIS — D51.3 OTHER DIETARY VITAMIN B12 DEFICIENCY ANEMIA: ICD-10-CM

## (undated) DIAGNOSIS — E53.8 B12 DEFICIENCY: ICD-10-CM

## (undated) DIAGNOSIS — D50.9 IRON DEFICIENCY ANEMIA, UNSPECIFIED IRON DEFICIENCY ANEMIA TYPE: ICD-10-CM

## (undated) DIAGNOSIS — E03.9 HYPOTHYROIDISM, UNSPECIFIED TYPE: Primary | ICD-10-CM

## (undated) DEVICE — TROCAR: Brand: KII FIOS FIRST ENTRY

## (undated) DEVICE — ROBOTIC: Brand: MEDLINE INDUSTRIES, INC.

## (undated) DEVICE — PROXIMATE SKIN STAPLERS (35 WIDE) CONTAINS 35 STAINLESS STEEL STAPLES (FIXED HEAD): Brand: PROXIMATE

## (undated) DEVICE — CANNULA SEAL

## (undated) DEVICE — TROCAR: Brand: KII® SLEEVE

## (undated) DEVICE — COLUMN DRAPE

## (undated) DEVICE — ARM DRAPE

## (undated) DEVICE — SUT VICRYL 2-0 CT-1 J945H

## (undated) DEVICE — SUTURE VICRYL 0 UR-6

## (undated) DEVICE — 3M™ IOBAN™ 2 ANTIMICROBIAL INCISE DRAPE 6650EZ: Brand: IOBAN™ 2

## (undated) DEVICE — MEDI-VAC NON-CONDUCTIVE SUCTION TUBING 6MM X 1.8M (6FT.) L: Brand: CARDINAL HEALTH

## (undated) DEVICE — DRAPE SHEET LG

## (undated) DEVICE — FORCEP RADIAL JAW 4

## (undated) DEVICE — SUTURE ENDOSTITCH 2-0 VIO 48\"

## (undated) DEVICE — SUT VICRYL 3-0 FS-1 J442H

## (undated) DEVICE — TUBING MEGADYNE LAPAROSCOPIC

## (undated) DEVICE — [HIGH FLOW INSUFFLATOR,  DO NOT USE IF PACKAGE IS DAMAGED,  KEEP DRY,  KEEP AWAY FROM SUNLIGHT,  PROTECT FROM HEAT AND RADIOACTIVE SOURCES.]: Brand: PNEUMOSURE

## (undated) DEVICE — SUTURE VLOC 90 2-0 9" 2145

## (undated) DEVICE — DRAPE SHEET LAPCHOLE 124X100X7

## (undated) DEVICE — SOL  .9 1000ML BTL

## (undated) DEVICE — DISPOSABLE DISTAL ATTACHMENT: Brand: DISPOSABLE DISTAL ATTACHMENT

## (undated) DEVICE — GAMMEX® PI HYBRID SIZE 7, STERILE POWDER-FREE SURGICAL GLOVE, POLYISOPRENE AND NEOPRENE BLEND: Brand: GAMMEX

## (undated) DEVICE — 3M™ STERI-DRAPE™ INSTRUMENT POUCH 1018L: Brand: STERI-DRAPE™

## (undated) DEVICE — TIP COVER ACCESSORY

## (undated) DEVICE — LAP CHOLE: Brand: MEDLINE INDUSTRIES, INC.

## (undated) DEVICE — DISPOSABLE GRASPER: Brand: EPIX LAPAROSCOPIC GRASPER

## (undated) DEVICE — KIT CLEAN ENDOKIT 1.1OZ GOWNX2

## (undated) DEVICE — KENDALL SCD EXPRESS SLEEVES, KNEE LENGTH, MEDIUM: Brand: KENDALL SCD

## (undated) DEVICE — SUTURING DEVICE: Brand: ENDO STITCH

## (undated) DEVICE — TROCARS: Brand: KII® BALLOON BLUNT TIP SYSTEM

## (undated) DEVICE — SUTURE MONOCRYL 4-0 PS-2

## (undated) DEVICE — KIT ENDO ORCAPOD 160/180/190

## (undated) DEVICE — SOL  .9 3000ML

## (undated) DEVICE — TOWEL SURG OR 17X30IN BLUE

## (undated) DEVICE — Device: Brand: CUSTOM PROCEDURE KIT

## (undated) DEVICE — LINE MNTR ADLT SET O2 INTMD

## (undated) DEVICE — DISPOSABLE SUCTION/IRRIGATOR TUBE SET: Brand: AHTO

## (undated) DEVICE — 3M™ STERI-STRIP™ REINFORCED ADHESIVE SKIN CLOSURES, R1547, 1/2 IN X 4 IN (12 MM X 100 MM), 6 STRIPS/ENVELOPE: Brand: 3M™ STERI-STRIP™

## (undated) DEVICE — VIOLET BRAIDED (POLYGLACTIN 910), SYNTHETIC ABSORBABLE SUTURE: Brand: COATED VICRYL

## (undated) DEVICE — GYN CDS: Brand: MEDLINE INDUSTRIES, INC.

## (undated) DEVICE — ABSORBABLE WOUND CLOSURE DEVICE: Brand: V-LOC 90

## (undated) DEVICE — MONOPOLAR CURVED SCISSORS: Brand: ENDOWRIST

## (undated) DEVICE — TRAY SURESTEP 16 BARDEX DRAIN

## (undated) DEVICE — COVER SGL STRL LGHT HNDL BLU

## (undated) DEVICE — GAMMEX® PI HYBRID SIZE 7.5, STERILE POWDER-FREE SURGICAL GLOVE, POLYISOPRENE AND NEOPRENE BLEND: Brand: GAMMEX

## (undated) DEVICE — UNDYED BRAIDED (POLYGLACTIN 910), SYNTHETIC ABSORBABLE SUTURE: Brand: COATED VICRYL

## (undated) DEVICE — Device: Brand: DEFENDO AIR/WATER/SUCTION AND BIOPSY VALVE

## (undated) DEVICE — SUT ETHIBOND 0 CT-2 CX27D

## (undated) DEVICE — ADHESIVE MASTISOL 2/3ML

## (undated) DEVICE — LAPAROVUE VISIBILITY SYSTEM LAPAROSCOPIC SOLUTIONS: Brand: LAPAROVUE

## (undated) DEVICE — LAP LIGASURE 5MM BLUNT

## (undated) DEVICE — GLOVE SURG TRIUMPH SZ 71/2

## (undated) DEVICE — BLADELESS OBTURATOR: Brand: WECK VISTA

## (undated) DEVICE — MEGA SUTURECUT ND: Brand: ENDOWRIST

## (undated) DEVICE — SYSTEM SURGYPAD VELCRO 36IN

## (undated) DEVICE — C-ARM: Brand: UNBRANDED

## (undated) DEVICE — 9534HP TRANSPARENT DRSG W/FRAME: Brand: 3M™ TEGADERM™

## (undated) DEVICE — CONMED SCOPE SAVER BITE BLOCK, 20X27 MM: Brand: SCOPE SAVER

## (undated) DEVICE — SOL NACL IRRIG 0.9% 1000ML BTL

## (undated) NOTE — LETTER
[facility]  Phone: [phone]  Right Fax: [phone]  [facility] By:  [facility]                 Date: 2/6/20    Patient Name: Annabel Willson  Surgery Date: 2/11/2020    CSN: [id number]  Medical Record: E

## (undated) NOTE — LETTER
[address], [place]  Authorization for Invasive Procedures  1. I hereby authorize Dr. Yvon Galvez , my physician and whomever may be designated as the doctor's assistant, to perform the following operation and/or procedure:  Flexible Sigmoidoscopy on Sherry Elizondo at [facility].    2. My physician has explained to me the nature and purpose of the operation or other procedure, possible alternative methods of treatment, the risks involved and the possibility of complications to me. I understand the probable consequences of declining the recommended procedure and the alternative methods of treatment. I acknowledge that no guarantee has been made as to the result that may be obtained. 3. I recognize that during the course of this operation or other procedure, unforeseen conditions may necessitate additional or different procedures than those listed above. I, therefore, further authorize and request that the above-named physician, his/her physician assistants, or designees perform such procedures as are, in his/her professional opinion, necessary and desirable. If I have a Do Not Attempt Resuscitation (DNAR) order in place, that status will be suspended while in the operating room, procedural suite, and during the recovery period unless otherwise explicitly stated by me (or a person authorized to consent on my behalf). The surgeon or my attending physician will determine when the applicable recovery period ends for purposes of reinstating the DNAR order. 4. Should the need arise during my operation or immediate post-operative period; I also consent to the administration of blood and/or blood products.  Further, I understand that despite careful testing and screening of blood and blood products, I may still be subject to ill effects as a result of recieving a blood transfusion an/or blood producst. The following are some, but not all, of the potential risks that can occur: fever and allergic reactions, hemolytic reactions, transmission of disease such as hepatitis, AIDS, cytomegalovirus (CMV), and flluid overload. In the event that I wish to have autologous transfusions of my own blood, or a directed donor transfusion, I will discuss this with my physician. 5. I consent to the photographing of the operations or procedures to be performed for the purposes of advancing medicine, science, and/or education, provided my identity is not revealed. If the procedure has been videotaped, the physician/surgeon will obtain the original videotape. The hospital will not be responsible for storage or maintenance of this tape. 6. I consent to the presence of a  or observer as deemed necessary by my physician or his designee. 7. Any tissues or organs removed in the operation or other procedure may be disposed of by and at the discretion of [facility].    8. I understand that the physician and his/her physician assistants may not be employees or agents of [facility], [facility], nor [facility], but are independent medical practitioners who have been permitted to use its facilities for the care and treatment of their patients. 9. Patients having a sterilization procedure: I understand that if the procedure is successful the results will be permanent and it will therefore be impossible for me to inseminate, conceive or bear children. I also understand that the procedure is intended to result in sterility, although the result has not been guaranteed. 10. I CERTIFY THAT I HAVE READ AND FULLY UNDERSTAND THE ABOVE CONSENT TO OPERATION and/or OTHER PROCEDURE. 11. I acknowledge that my physician has explained sedation/analgesia administration to me including the risks and benefits. I consent to the administration of sedation/analgesia as may be necessary or desirable in the judgment of my physician.      Signature of Patient:  ________________________________________________ Date: _________Time: _________    Responsible person in case of minor or unconscious: _____________________________Relationship: ____________     Witness Signature: ____________________________________________ Date: __________ Time: ___________    Statement of Physician  My signature below affirms that prior to the time of the procedure, I have explained to the patient and/or her legal representative, the risks and benefits involved in the proposed treatment and any reasonable alternative to the proposed treatment. I have also explained the risks and benefits involved in the refusal of the proposed treatment and have answered the patient's questions. If I have a significant financial interest in this procedure/surgery, I have disclosed this and had a discussion with my patient.     Signature of Physician:   ________________________________________Date: _________Time:_______ Patient Name: Maliha Marsh  : 1940   Printed: 2022    Medical Record #: [id number]

## (undated) NOTE — LETTER
[facility]  Administration of Anesthesia  1. I, Ria 145, or _________________________________ acting on her behalf, (Patient) (Dependent/Representative) request to receive anesthesia for my pending procedure/operation/treatment. A physician (anesthesiologist) alone or an anesthesiologist working with a nurse anesthetist may administer my anesthesia. 2. I understand that my anesthesiologist is not an employee or agent of the hospital, but is an independent medical practitioner who has been permitted to use its facilities for the care and treatment of his/her patients. 3. I acknowledge that a physician from [facility] or their designate(s), recommended anesthesia for me using her/his medical judgment. The type(s) of anesthesia I may receive include:                a) General Anesthesia, b) Spinal/Epidural Anesthesia, c) Regional Anesthesia or d) Monitored Anesthesia Care. 4. If my spinal, regional or monitored anesthesia care (local) is not satisfactory for my comfort, or if my medical condition requires, I consent to the administration of general anesthesia. 5. I am aware that the practice of anesthesiology is not an exact science and that some foreseeable risks or consequences may occur. Some common risks/consequences include sore throat and hoarseness, nausea and vomiting, muscle soreness, backache, damage to the mouth/teeth/vocal cords and eye injury. I understand that more rare but serious potential risks of anesthesia include blood pressure changes, drug reactions, cardiac arrest, brain damage, paralysis or death. These risks apply to whether I have general, spinal/epidural, regional or monitored anesthesia care. 6. OBSTETRIC PATIENTS: Specific risks/consequences of spinal/epidural anesthesia may include itching, low blood pressure, difficulty urinating, slowing of the baby's heart rate and headache.  Rare risks include infections, high spinal block, spinal bleeding, seizure, cardiac arrest and death. 7. AWARENESS: I understand that it is possible (but unlikely) to have explicit memory of events from the operating room while under general anesthesia. 8. ELECTROCONVULSIVE THERAPY PATIENTS: This consent serves for all treatments in a single course of therapy. 9. I understand that I must inform my anesthesiologist when I last ate and/or drank to minimize the risk of anesthesia. 10. If I am pregnant, or may pregnant, I understand that elective surgery should be postponed until after the baby is born. Anesthetics cross the placenta and may temporarily anesthetize the baby. Although fetal complications of anesthesia during pregnancy are rare, they may include birth defects, premature labor, brain damage and death. 11. I certify that I informed the anesthesiologist, to the best of my ability, about medication I take including blood thinners, anticoagulants, herbal remedies, narcotics and recreational drugs (e.g. cocaine, marijuana, PCP). Failure to inform my anesthesiologist about these medicines may increase my risk of anesthetic complications. The nature and purpose of my anesthetic management was explained to me. I had the opportunity to ask questions and the answers and information provided meet my satisfaction.   I retain the right to withdraw this consent at any time prior to the administration of said anesthetic.    ___________________________________________________           _____________________________________________________  Patient Signature                                                                                      Witness Signature                ___________________________________________________           _____________________________________________________  Date/Time                                                                                               Responsible person in case of minor/ unconscious pt /Relationship    My signature below affirms that prior to the time of the procedure, I have explained to the patient and/or his/her guardian, the risks and benefits of undergoing anesthesia, as well as any reasonable alternatives.     ___________________________________________________            _____________________________________________________  Physician Signature                            Date/Time  Patient Name: Scott Calhoun     : 1940     Printed: 2022      Medical Record #: [id number]                              Page 1 of 1    ----------ANESTHESIA CONSENT----------

## (undated) NOTE — LETTER
[facility] Testing Department  Phone: [phone]  OUTSIDE TESTING RESULT REQUEST      TO:   Dr. Tegan Sommers Date: 2/6/20    FAX #:      IMPORTANT: FOR YOUR IMMEDIATE ATTENTION  Please FAX all test results listed below to: [phone]